# Patient Record
Sex: MALE | HISPANIC OR LATINO | Employment: FULL TIME | URBAN - METROPOLITAN AREA
[De-identification: names, ages, dates, MRNs, and addresses within clinical notes are randomized per-mention and may not be internally consistent; named-entity substitution may affect disease eponyms.]

---

## 2023-06-10 ENCOUNTER — HOSPITAL ENCOUNTER (EMERGENCY)
Facility: CLINIC | Age: 23
Discharge: HOME | End: 2023-06-10
Attending: STUDENT IN AN ORGANIZED HEALTH CARE EDUCATION/TRAINING PROGRAM | Admitting: STUDENT IN AN ORGANIZED HEALTH CARE EDUCATION/TRAINING PROGRAM

## 2023-06-10 VITALS
HEART RATE: 80 BPM | SYSTOLIC BLOOD PRESSURE: 140 MMHG | WEIGHT: 148 LBS | TEMPERATURE: 98.8 F | OXYGEN SATURATION: 98 % | RESPIRATION RATE: 18 BRPM | DIASTOLIC BLOOD PRESSURE: 76 MMHG

## 2023-06-10 DIAGNOSIS — M54.50 ACUTE BILATERAL LOW BACK PAIN WITHOUT SCIATICA: ICD-10-CM

## 2023-06-10 PROCEDURE — 99283 EMERGENCY DEPT VISIT LOW MDM: CPT

## 2023-06-10 RX ORDER — CYCLOBENZAPRINE HCL 5 MG
5 TABLET ORAL 3 TIMES DAILY PRN
Qty: 15 TABLET | Refills: 0 | Status: SHIPPED | OUTPATIENT
Start: 2023-06-10 | End: 2023-06-15

## 2023-06-10 ASSESSMENT — ENCOUNTER SYMPTOMS
FEVER: 0
DYSURIA: 0
HEMATURIA: 0
NUMBNESS: 0
CONSTIPATION: 0
DIARRHEA: 0
WEAKNESS: 0
FREQUENCY: 0
DIFFICULTY URINATING: 0
BACK PAIN: 1

## 2023-06-10 ASSESSMENT — ACTIVITIES OF DAILY LIVING (ADL): ADLS_ACUITY_SCORE: 33

## 2023-06-10 NOTE — LETTER
Yudy 10, 2023      To Whom It May Concern:      Hosea Isaacs was seen in our Emergency Department today, 06/10/23.  I expect his condition to improve over the next 10 days.  He may return to work when improved.    Sincerely,        LYNNE Saldana MD

## 2023-06-10 NOTE — ED TRIAGE NOTES
Pt presents to the ED with c/o lower back pain that started yesterday, has been increasing since. Denies any known injury/truama. Has taken ibuprofen with some relief. No hx of back pain/injuries     Triage Assessment     Row Name 06/10/23 0933       Triage Assessment (Adult)    Airway WDL WDL       Respiratory WDL    Respiratory WDL WDL       Skin Circulation/Temperature WDL    Skin Circulation/Temperature WDL WDL       Cardiac WDL    Cardiac WDL WDL       Peripheral/Neurovascular WDL    Peripheral Neurovascular WDL WDL       Cognitive/Neuro/Behavioral WDL    Cognitive/Neuro/Behavioral WDL WDL

## 2023-06-10 NOTE — ED NOTES
Pt agree's with discharge instructions, no other questions at this time. See providers notes for further assessment.

## 2023-06-10 NOTE — ED PROVIDER NOTES
EMERGENCY DEPARTMENT ENCOUNTER      NAME: Hosea Isaacs  AGE: 23 year old male  YOB: 2000  MRN: [id number]  EVALUATION DATE & TIME: No admission date for patient encounter.    PCP: No Ref-Primary, Physician    ED PROVIDER: Carlos A Saldana M.D.      Chief Complaint   Patient presents with     Back Pain         FINAL IMPRESSION:  1. Acute bilateral low back pain without sciatica          ED COURSE & MEDICAL DECISION MAKING:    Pertinent Labs & Imaging studies reviewed. (See chart for details)  23 year old male presents to the Emergency Department for evaluation of back pain.  Patient with clearly musculoskeletal back pain.  We did speak to there was some pain a little bit more lateral than I would expect.  But I suspect this is not true CVA tenderness and is clearly lower lumbar spine pain.  We will discharge patient with conservative treatment.  No red flags so while MRI was certainly considered I do not think this is necessary.    4:33 PM I met with the patient for the initial interview and physical examination. Discussed plan for treatment and workup in the ED. We discussed the plan for discharge and the patient is agreeable. Reviewed supportive cares, symptomatic treatment, outpatient follow up, and reasons to return to the Emergency Department. All questions and concerns were addressed. Patient to be discharged by ED RN.      At the conclusion of the encounter I discussed the results of all of the tests and the disposition. The questions were answered. The patient or family acknowledged understanding and was agreeable with the care plan.              Medical Decision Making    History:    Supplemental history from: Documented in chart, if applicable    External Record(s) reviewed: Documented in chart, if applicable.    Work Up:    Chart documentation includes differential considered and any EKGs or imaging independently interpreted by provider, where specified.    In additional to work up  documented, I considered the following work up: Documented in chart, if applicable.    External consultation:    Discussion of management with another provider: Documented in chart, if applicable    Complicating factors:    Care impacted by chronic illness: N/A    Care affected by social determinants of health: Access to Medical Care    Disposition considerations: Discharge. I prescribed additional prescription strength medication(s) as charted. See documentation for any additional details.      MEDICATIONS GIVEN IN THE EMERGENCY:  Medications - No data to display    NEW PRESCRIPTIONS STARTED AT TODAY'S ER VISIT  Discharge Medication List as of 6/10/2023  5:13 PM      START taking these medications    Details   cyclobenzaprine (FLEXERIL) 5 MG tablet Take 1 tablet (5 mg) by mouth 3 times daily as needed for muscle spasms, Disp-15 tablet, R-0, Local Print                =================================================================    HPI    Patient information was obtained from: Patient         Hosea Isaacs is a 23 year old male with no contributory history who presents for evaluation of back pain.    Patient reports sharp lower back pain that developed last night and has worsened since waking up this morning. He does note that he had gone dancing last night and states that the pain is exacerbated by positional changes such as twisting or bending down. Patient denies numbness, weakness, bowel or bladder symptoms, fever, or additional symptoms or complaints at this time.       REVIEW OF SYSTEMS   Review of Systems   Constitutional: Negative for fever.   Gastrointestinal: Negative for constipation and diarrhea.   Genitourinary: Negative for difficulty urinating, dysuria, frequency, hematuria and urgency.   Musculoskeletal: Positive for back pain (lower).   Neurological: Negative for weakness and numbness.   All other systems reviewed and are negative.       PAST MEDICAL HISTORY:  History reviewed. No pertinent  past medical history.    PAST SURGICAL HISTORY:  History reviewed. No pertinent surgical history.        CURRENT MEDICATIONS:    cyclobenzaprine (FLEXERIL) 5 MG tablet        ALLERGIES:  No Known Allergies    FAMILY HISTORY:  History reviewed. No pertinent family history.    SOCIAL HISTORY:   Social History     Socioeconomic History     Marital status: Single       VITALS:  BP (!) 140/76   Pulse 80   Temp 98.8  F (37.1  C) (Temporal)   Resp 18   Wt 67.1 kg (148 lb)   SpO2 98%       PHYSICAL EXAM    VITAL SIGNS: BP (!) 140/76   Pulse 80   Temp 98.8  F (37.1  C) (Temporal)   Resp 18   Wt 67.1 kg (148 lb)   SpO2 98%   Constitutional:  Well developed, well nourished   EYES: Conjunctivae clear, no discharge  HENT: Atraumatic, normocephalic, bilateral external ears normal.  Oropharynx moist. Nose normal.   Neck: Normal ROM , Supple   Respiratory:  No respiratory distress, normal nonlabored respirations.   Cardiovascular:  Distal perfusion appears intact  Musculoskeletal:  No edema appreciated, No cyanosis, No clubbing. Good range of motion in all major joints. Pain to palpation of the right lower lumbar paraspinal muscles. No CVA tenderness. Normal strength in lower extremities   Integument:  Warm, Dry, No erythema, No rash.   Neurologic:  Alert and oriented. No focal deficits noted.  Ambulatory  Psychiatric:  Affect normal      LAB:  All pertinent labs reviewed and interpreted.  Labs Ordered and Resulted from Time of ED Arrival to Time of ED Departure - No data to display    RADIOLOGY:  Reviewed all pertinent imaging. Please see official radiology report.  No orders to display       Laureano MONTERROSO, am serving as a scribe to document services personally performed by Dr. Carlos A Saldana based on my observation and the provider's statements to me. Carlos A MNOTERROSO MD attest that Laureano Montes is acting in a scribe capacity, has observed my performance of the services and has documented them in accordance with my  direction.    Carlos A Saldana M.D.  Emergency Medicine  [facility] EMERGENCY ROOM  [address] [address] [phone]  Dept: [phone]     Carlos A Saldana MD  06/11/23 0029

## 2024-07-23 ENCOUNTER — OFFICE VISIT (OUTPATIENT)
Dept: FAMILY MEDICINE | Facility: CLINIC | Age: 24
End: 2024-07-23
Payer: COMMERCIAL

## 2024-07-23 VITALS
HEART RATE: 83 BPM | WEIGHT: 152.2 LBS | HEIGHT: 65 IN | SYSTOLIC BLOOD PRESSURE: 114 MMHG | DIASTOLIC BLOOD PRESSURE: 74 MMHG | BODY MASS INDEX: 25.36 KG/M2 | OXYGEN SATURATION: 100 % | RESPIRATION RATE: 14 BRPM | TEMPERATURE: 98.5 F

## 2024-07-23 DIAGNOSIS — F43.23 ADJUSTMENT DISORDER WITH MIXED ANXIETY AND DEPRESSED MOOD: Primary | ICD-10-CM

## 2024-07-23 DIAGNOSIS — R07.89 ATYPICAL CHEST PAIN: ICD-10-CM

## 2024-07-23 LAB
ALBUMIN SERPL BCG-MCNC: 5.2 G/DL (ref 3.5–5.2)
ALP SERPL-CCNC: 89 U/L (ref 40–150)
ALT SERPL W P-5'-P-CCNC: 26 U/L (ref 0–70)
ANION GAP SERPL CALCULATED.3IONS-SCNC: 10 MMOL/L (ref 7–15)
AST SERPL W P-5'-P-CCNC: 30 U/L (ref 0–45)
ATRIAL RATE - MUSE: 66 BPM
BILIRUB SERPL-MCNC: 1 MG/DL
BUN SERPL-MCNC: 17 MG/DL (ref 6–20)
CALCIUM SERPL-MCNC: 9.8 MG/DL (ref 8.8–10.4)
CHLORIDE SERPL-SCNC: 102 MMOL/L (ref 98–107)
CREAT SERPL-MCNC: 0.8 MG/DL (ref 0.67–1.17)
DIASTOLIC BLOOD PRESSURE - MUSE: NORMAL MMHG
EGFRCR SERPLBLD CKD-EPI 2021: >90 ML/MIN/1.73M2
ERYTHROCYTE [DISTWIDTH] IN BLOOD BY AUTOMATED COUNT: 12.7 % (ref 10–15)
GLUCOSE SERPL-MCNC: 90 MG/DL (ref 70–99)
HCO3 SERPL-SCNC: 28 MMOL/L (ref 22–29)
HCT VFR BLD AUTO: 48 % (ref 40–53)
HGB BLD-MCNC: 15.8 G/DL (ref 13.3–17.7)
INTERPRETATION ECG - MUSE: NORMAL
MCH RBC QN AUTO: 30 PG (ref 26.5–33)
MCHC RBC AUTO-ENTMCNC: 32.9 G/DL (ref 31.5–36.5)
MCV RBC AUTO: 91 FL (ref 78–100)
P AXIS - MUSE: 38 DEGREES
PLATELET # BLD AUTO: 273 10E3/UL (ref 150–450)
POTASSIUM SERPL-SCNC: 5.2 MMOL/L (ref 3.4–5.3)
PR INTERVAL - MUSE: 138 MS
PROT SERPL-MCNC: 8.2 G/DL (ref 6.4–8.3)
QRS DURATION - MUSE: 94 MS
QT - MUSE: 376 MS
QTC - MUSE: 394 MS
R AXIS - MUSE: 61 DEGREES
RBC # BLD AUTO: 5.27 10E6/UL (ref 4.4–5.9)
SODIUM SERPL-SCNC: 140 MMOL/L (ref 135–145)
SYSTOLIC BLOOD PRESSURE - MUSE: NORMAL MMHG
T AXIS - MUSE: 54 DEGREES
TSH SERPL DL<=0.005 MIU/L-ACNC: 0.9 UIU/ML (ref 0.3–4.2)
VENTRICULAR RATE- MUSE: 66 BPM
WBC # BLD AUTO: 4.7 10E3/UL (ref 4–11)

## 2024-07-23 PROCEDURE — 84443 ASSAY THYROID STIM HORMONE: CPT

## 2024-07-23 PROCEDURE — 93005 ELECTROCARDIOGRAM TRACING: CPT

## 2024-07-23 PROCEDURE — 36415 COLL VENOUS BLD VENIPUNCTURE: CPT

## 2024-07-23 PROCEDURE — 85027 COMPLETE CBC AUTOMATED: CPT

## 2024-07-23 PROCEDURE — 99203 OFFICE O/P NEW LOW 30 MIN: CPT

## 2024-07-23 PROCEDURE — 80053 COMPREHEN METABOLIC PANEL: CPT

## 2024-07-23 PROCEDURE — 93010 ELECTROCARDIOGRAM REPORT: CPT | Performed by: GENERAL ACUTE CARE HOSPITAL

## 2024-07-23 NOTE — PROGRESS NOTES
Assessment & Plan     Adjustment disorder with mixed anxiety and depressed mood  Recent situational stressors with break-up.  Reports still has good support through friends.  [place]  Will have trouble regulating his emotions at times.  Referral placed for counseling/therapy and we discussed following up if he would like to discuss medications.  - Adult Mental Health  Referral    Atypical chest pain  Intermittent nonexertional chest pain without associated symptoms or specific triggers ongoing for many years.  EKG today showing normal sinus rhythm/sinus arrhythmia.  Recommend evaluation with labs and if unremarkable we discussed him monitoring for any clear trigger or associated symptoms.  We discussed possibility of anxiety, musculoskeletal, gastrointestinal etiology.  - REVIEW OF HEALTH MAINTENANCE PROTOCOL ORDERS  - Comprehensive metabolic panel (BMP + Alb, Alk Phos, ALT, AST, Total. Bili, TP)  - TSH with free T4 reflex  - CBC with platelets  - EKG 12-lead, tracing only      Follow-up for routine annual physical.        Chente Jc is a 24 year old, presenting for the following health issues:  [facility] Visit (Mental Health Therapy referral, sharp chest pain on and off every few months. Pt thinks he has autism.) and Recheck Medication      7/23/2024    12:21 PM   Additional Questions   Roomed by lawanda fermin   Accompanied by alone     History of Present Illness       Reason for visit:  Health Check up    He eats 0-1 servings of fruits and vegetables daily.He consumes 2 sweetened beverage(s) daily.He exercises with enough effort to increase his heart rate 10 to 19 minutes per day.  He exercises with enough effort to increase his heart rate 3 or less days per week.   He is taking medications regularly.     Would like to discuss his mental health.  Difficulty regulating emotions.  Irritable, feels like when he is overwhelmed with lash out at family/friends.  Recent breakup. Originally  "from [place], moved here 3 years ago to be with ex-partner. Still living together. Denies thoughts of self harm or suicidal ideation.   Occasional intrusive thoughts and ruminating.   Sleep and appetite have been okay.     - travels frequently.    Intermittent chest pain - for years. Sharp, does not radiate. Denies palpitations, dyspnea, nausea. Drinks caffeine and energy drinks on occasion.  Starts spontaneously and resolves on its own.   Has never occurred when exercing. Notice more when sitting at rest.  Unsure if triggered by foods or related to food intake.     Had Tdap in the last few years due to a work related injury.       Review of Systems  Constitutional, HEENT, cardiovascular, pulmonary, gi and gu systems are negative, except as otherwise noted.      Objective    /74 (BP Location: Left arm, Patient Position: Sitting, Cuff Size: Adult Regular)   Pulse 83   Temp 98.5  F (36.9  C) (Tympanic)   Resp 14   Ht 1.64 m (5' 4.57\")   Wt 69 kg (152 lb 3.2 oz)   SpO2 100%   BMI 25.67 kg/m    Body mass index is 25.67 kg/m .    Physical Exam   GENERAL: alert and no distress  NECK: no adenopathy, no asymmetry, masses, or scars  RESP: lungs clear to auscultation - no rales, rhonchi or wheezes  CV: regular rate and rhythm, normal S1 S2, no S3 or S4, no murmur, click or rub, no peripheral edema  ABDOMEN: soft, nontender, no hepatosplenomegaly, no masses and bowel sounds normal  PSYCH: mentation appears normal and affect flat        Signed Electronically by: MAGDY Manzano CNP    "

## 2024-07-28 ENCOUNTER — HEALTH MAINTENANCE LETTER (OUTPATIENT)
Age: 24
End: 2024-07-28

## 2024-08-12 ENCOUNTER — HOSPITAL ENCOUNTER (INPATIENT)
Facility: CLINIC | Age: 24
LOS: 4 days | Discharge: HOME | End: 2024-08-16
Attending: PSYCHIATRY & NEUROLOGY | Admitting: PSYCHIATRY & NEUROLOGY
Payer: COMMERCIAL

## 2024-08-12 ENCOUNTER — HOSPITAL ENCOUNTER (EMERGENCY)
Facility: CLINIC | Age: 24
Discharge: NURSING, REHAB OR LONG-TERM CARE | End: 2024-08-12
Attending: EMERGENCY MEDICINE | Admitting: EMERGENCY MEDICINE
Payer: COMMERCIAL

## 2024-08-12 ENCOUNTER — TELEPHONE (OUTPATIENT)
Dept: BEHAVIORAL HEALTH | Facility: CLINIC | Age: 24
End: 2024-08-12
Payer: COMMERCIAL

## 2024-08-12 VITALS
SYSTOLIC BLOOD PRESSURE: 154 MMHG | HEIGHT: 64 IN | OXYGEN SATURATION: 98 % | RESPIRATION RATE: 18 BRPM | BODY MASS INDEX: 25.61 KG/M2 | TEMPERATURE: 99.3 F | WEIGHT: 150 LBS | DIASTOLIC BLOOD PRESSURE: 112 MMHG | HEART RATE: 110 BPM

## 2024-08-12 DIAGNOSIS — F51.05 INSOMNIA DUE TO OTHER MENTAL DISORDER: ICD-10-CM

## 2024-08-12 DIAGNOSIS — F41.9 ANXIETY: ICD-10-CM

## 2024-08-12 DIAGNOSIS — F99 INSOMNIA DUE TO OTHER MENTAL DISORDER: ICD-10-CM

## 2024-08-12 DIAGNOSIS — F33.41 RECURRENT MAJOR DEPRESSIVE DISORDER, IN PARTIAL REMISSION (H): Primary | ICD-10-CM

## 2024-08-12 DIAGNOSIS — R45.851 SUICIDAL IDEATION: ICD-10-CM

## 2024-08-12 DIAGNOSIS — F43.23 ADJUSTMENT DISORDER WITH MIXED ANXIETY AND DEPRESSED MOOD: ICD-10-CM

## 2024-08-12 PROBLEM — F32.9 MAJOR DEPRESSION: Status: ACTIVE | Noted: 2024-08-12

## 2024-08-12 LAB
ALBUMIN SERPL BCG-MCNC: 5.3 G/DL (ref 3.5–5.2)
ALP SERPL-CCNC: 95 U/L (ref 40–150)
ALT SERPL W P-5'-P-CCNC: 47 U/L (ref 0–70)
AMPHETAMINES UR QL SCN: NORMAL
ANION GAP SERPL CALCULATED.3IONS-SCNC: 11 MMOL/L (ref 7–15)
AST SERPL W P-5'-P-CCNC: 43 U/L (ref 0–45)
BARBITURATES UR QL SCN: NORMAL
BASOPHILS # BLD AUTO: 0 10E3/UL (ref 0–0.2)
BASOPHILS NFR BLD AUTO: 1 %
BENZODIAZ UR QL SCN: NORMAL
BILIRUB SERPL-MCNC: 0.7 MG/DL
BUN SERPL-MCNC: 11.4 MG/DL (ref 6–20)
BZE UR QL SCN: NORMAL
CALCIUM SERPL-MCNC: 9.7 MG/DL (ref 8.8–10.4)
CANNABINOIDS UR QL SCN: NORMAL
CHLORIDE SERPL-SCNC: 103 MMOL/L (ref 98–107)
CREAT SERPL-MCNC: 0.75 MG/DL (ref 0.51–1.17)
EGFRCR SERPLBLD CKD-EPI 2021: >90 ML/MIN/1.73M2
EOSINOPHIL # BLD AUTO: 0 10E3/UL (ref 0–0.7)
EOSINOPHIL NFR BLD AUTO: 0 %
ERYTHROCYTE [DISTWIDTH] IN BLOOD BY AUTOMATED COUNT: 12.9 % (ref 10–15)
FENTANYL UR QL: NORMAL
GLUCOSE SERPL-MCNC: 98 MG/DL (ref 70–99)
HCO3 SERPL-SCNC: 28 MMOL/L (ref 22–29)
HCT VFR BLD AUTO: 49.4 % (ref 35–53)
HGB BLD-MCNC: 16.8 G/DL (ref 11.7–17.7)
IMM GRANULOCYTES # BLD: 0 10E3/UL
IMM GRANULOCYTES NFR BLD: 0 %
LYMPHOCYTES # BLD AUTO: 1.3 10E3/UL (ref 0.8–5.3)
LYMPHOCYTES NFR BLD AUTO: 16 %
MCH RBC QN AUTO: 31.2 PG (ref 26.5–33)
MCHC RBC AUTO-ENTMCNC: 34 G/DL (ref 31.5–36.5)
MCV RBC AUTO: 92 FL (ref 78–100)
MONOCYTES # BLD AUTO: 0.7 10E3/UL (ref 0–1.3)
MONOCYTES NFR BLD AUTO: 9 %
NEUTROPHILS # BLD AUTO: 6.1 10E3/UL (ref 1.6–8.3)
NEUTROPHILS NFR BLD AUTO: 75 %
NRBC # BLD AUTO: 0 10E3/UL
NRBC BLD AUTO-RTO: 0 /100
OPIATES UR QL SCN: NORMAL
PCP QUAL URINE (ROCHE): NORMAL
PLATELET # BLD AUTO: 287 10E3/UL (ref 150–450)
POTASSIUM SERPL-SCNC: 4.1 MMOL/L (ref 3.4–5.3)
PROT SERPL-MCNC: 8.6 G/DL (ref 6.4–8.3)
RBC # BLD AUTO: 5.38 10E6/UL (ref 3.8–5.9)
SARS-COV-2 RNA RESP QL NAA+PROBE: NEGATIVE
SODIUM SERPL-SCNC: 142 MMOL/L (ref 135–145)
TSH SERPL DL<=0.005 MIU/L-ACNC: 0.95 UIU/ML (ref 0.3–4.2)
WBC # BLD AUTO: 8.1 10E3/UL (ref 4–11)

## 2024-08-12 PROCEDURE — 99285 EMERGENCY DEPT VISIT HI MDM: CPT | Mod: 25 | Performed by: EMERGENCY MEDICINE

## 2024-08-12 PROCEDURE — 82040 ASSAY OF SERUM ALBUMIN: CPT | Performed by: EMERGENCY MEDICINE

## 2024-08-12 PROCEDURE — 80307 DRUG TEST PRSMV CHEM ANLYZR: CPT | Performed by: EMERGENCY MEDICINE

## 2024-08-12 PROCEDURE — 250N000013 HC RX MED GY IP 250 OP 250 PS 637: Performed by: NURSE PRACTITIONER

## 2024-08-12 PROCEDURE — 36415 COLL VENOUS BLD VENIPUNCTURE: CPT | Performed by: EMERGENCY MEDICINE

## 2024-08-12 PROCEDURE — 87635 SARS-COV-2 COVID-19 AMP PRB: CPT | Performed by: EMERGENCY MEDICINE

## 2024-08-12 PROCEDURE — 99285 EMERGENCY DEPT VISIT HI MDM: CPT | Performed by: EMERGENCY MEDICINE

## 2024-08-12 PROCEDURE — 128N000002 HC R&B CD/MH ADOLESCENT

## 2024-08-12 PROCEDURE — H2032 ACTIVITY THERAPY, PER 15 MIN: HCPCS | Performed by: PSYCHOLOGIST

## 2024-08-12 PROCEDURE — 85048 AUTOMATED LEUKOCYTE COUNT: CPT | Performed by: EMERGENCY MEDICINE

## 2024-08-12 PROCEDURE — 84443 ASSAY THYROID STIM HORMONE: CPT | Performed by: EMERGENCY MEDICINE

## 2024-08-12 RX ORDER — IBUPROFEN 200 MG
200 TABLET ORAL EVERY 4 HOURS PRN
COMMUNITY

## 2024-08-12 RX ORDER — AMOXICILLIN 250 MG
1 CAPSULE ORAL 2 TIMES DAILY PRN
Status: DISCONTINUED | OUTPATIENT
Start: 2024-08-12 | End: 2024-08-16 | Stop reason: HOSPADM

## 2024-08-12 RX ORDER — IBUPROFEN 200 MG
200 TABLET ORAL EVERY 4 HOURS PRN
Status: DISCONTINUED | OUTPATIENT
Start: 2024-08-12 | End: 2024-08-16 | Stop reason: HOSPADM

## 2024-08-12 RX ORDER — HYDROXYZINE HYDROCHLORIDE 25 MG/1
25 TABLET, FILM COATED ORAL EVERY 4 HOURS PRN
Status: DISCONTINUED | OUTPATIENT
Start: 2024-08-12 | End: 2024-08-13

## 2024-08-12 RX ORDER — ACETAMINOPHEN 325 MG/1
650 TABLET ORAL EVERY 4 HOURS PRN
Status: DISCONTINUED | OUTPATIENT
Start: 2024-08-12 | End: 2024-08-16 | Stop reason: HOSPADM

## 2024-08-12 RX ORDER — MAGNESIUM HYDROXIDE/ALUMINUM HYDROXICE/SIMETHICONE 120; 1200; 1200 MG/30ML; MG/30ML; MG/30ML
30 SUSPENSION ORAL EVERY 4 HOURS PRN
Status: DISCONTINUED | OUTPATIENT
Start: 2024-08-12 | End: 2024-08-16 | Stop reason: HOSPADM

## 2024-08-12 RX ORDER — TRAZODONE HYDROCHLORIDE 50 MG/1
50 TABLET, FILM COATED ORAL
Status: DISCONTINUED | OUTPATIENT
Start: 2024-08-12 | End: 2024-08-14

## 2024-08-12 RX ADMIN — HYDROXYZINE HYDROCHLORIDE 25 MG: 25 TABLET ORAL at 21:46

## 2024-08-12 ASSESSMENT — ENCOUNTER SYMPTOMS
EYES NEGATIVE: 1
MUSCULOSKELETAL NEGATIVE: 1
ENDOCRINE NEGATIVE: 1
GASTROINTESTINAL NEGATIVE: 1
NEUROLOGICAL NEGATIVE: 1
RESPIRATORY NEGATIVE: 1
HEMATOLOGIC/LYMPHATIC NEGATIVE: 1
ALLERGIC/IMMUNOLOGIC NEGATIVE: 1
CARDIOVASCULAR NEGATIVE: 1
CONSTITUTIONAL NEGATIVE: 1

## 2024-08-12 ASSESSMENT — COLUMBIA-SUICIDE SEVERITY RATING SCALE - C-SSRS
4. HAVE YOU HAD THESE THOUGHTS AND HAD SOME INTENTION OF ACTING ON THEM?: NO
5. HAVE YOU STARTED TO WORK OUT OR WORKED OUT THE DETAILS OF HOW TO KILL YOURSELF? DO YOU INTEND TO CARRY OUT THIS PLAN?: YES
6. HAVE YOU EVER DONE ANYTHING, STARTED TO DO ANYTHING, OR PREPARED TO DO ANYTHING TO END YOUR LIFE?: YES
1. IN THE PAST MONTH, HAVE YOU WISHED YOU WERE DEAD OR WISHED YOU COULD GO TO SLEEP AND NOT WAKE UP?: YES
2. HAVE YOU ACTUALLY HAD ANY THOUGHTS OF KILLING YOURSELF IN THE PAST MONTH?: YES
3. HAVE YOU BEEN THINKING ABOUT HOW YOU MIGHT KILL YOURSELF?: YES

## 2024-08-12 ASSESSMENT — ACTIVITIES OF DAILY LIVING (ADL)
ADLS_ACUITY_SCORE: 45
ADLS_ACUITY_SCORE: 35
ADLS_ACUITY_SCORE: 35
ADLS_ACUITY_SCORE: 45
ADLS_ACUITY_SCORE: 35
ADLS_ACUITY_SCORE: 43
ADLS_ACUITY_SCORE: 45
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
## 2024-08-12 NOTE — ED PROVIDER NOTES
"  History     Chief Complaint   Patient presents with    Suicidal     Suicidal thoughts with plan for 15 hrs     HPI  Hosea Isaacs is a 24 year old male who presents with concern about feeling depressed with thoughts of self-harm.    Reviewed the medical record.  Office visit on 7/23/24-  diagnosis of adjustment disorder with mixed anxiety and depressed mood    On examination patient reports she feels depressed and that she has had thoughts of self-harm including jumping off a bridge or becoming a victim of a hate crime while intoxicated. Patient arrived by car alone from home in [place], [place] after reporting that she had called the crisis line.  She reports that she found some \"raunchy text messages\" on his ex-partner's phone last night while in bed.  She reports that she works as a  for [employer].  Originally from [facility] moved to [place] 3 years ago.  Due to infidelity with text messages seen on partner's phone yesterday she reports this made her feel depressed and ever since has had thoughts of ending her own life.  After conversation with the crisis line she drove to the emergency department for further care    Allergies:  No Known Allergies    Problem List:    Patient Active Problem List    Diagnosis Date Noted    Anxiety 08/12/2024     Priority: Medium    Major depression 08/12/2024     Priority: Medium        Past Medical History:    No past medical history on file.    Past Surgical History:    No past surgical history on file.    Family History:    No family history on file.    Social History:  Marital Status:  Single [1]  Social History     Tobacco Use    Smoking status: Never    Smokeless tobacco: Never   Vaping Use    Vaping status: Never Used        Medications:    ibuprofen (ADVIL/MOTRIN) 200 MG tablet          Review of Systems   Constitutional: Negative.    HENT: Negative.     Eyes: Negative.    Respiratory: Negative.     Cardiovascular: Negative.  " "  Gastrointestinal: Negative.    Endocrine: Negative.    Genitourinary: Negative.    Musculoskeletal: Negative.    Allergic/Immunologic: Negative.    Neurological: Negative.    Hematological: Negative.    Psychiatric/Behavioral:  Positive for suicidal ideas.    All other systems reviewed and are negative.      Physical Exam   BP: (!) 154/112  Pulse: 110  Temp: 99.3  F (37.4  C)  Resp: 18  Height: 162.6 cm (5' 4\")  Weight: 68 kg (150 lb)  SpO2: 98 %      Physical Exam  Constitutional:       General: She is not in acute distress.     Appearance: Normal appearance. She is not ill-appearing, toxic-appearing or diaphoretic.   HENT:      Head: Normocephalic and atraumatic.      Nose: Nose normal.   Eyes:      Extraocular Movements: Extraocular movements intact.      Pupils: Pupils are equal, round, and reactive to light.   Cardiovascular:      Rate and Rhythm: Normal rate and regular rhythm.      Pulses: Normal pulses.   Pulmonary:      Effort: Pulmonary effort is normal.      Breath sounds: Normal breath sounds.   Musculoskeletal:      Cervical back: Neck supple.   Skin:     Capillary Refill: Capillary refill takes less than 2 seconds.   Neurological:      General: No focal deficit present.      Mental Status: She is alert and oriented to person, place, and time.      GCS: GCS eye subscore is 4. GCS verbal subscore is 5. GCS motor subscore is 6.      Cranial Nerves: No cranial nerve deficit.      Sensory: No sensory deficit.      Motor: No weakness.      Coordination: Coordination normal.      Deep Tendon Reflexes: Reflexes normal.   Psychiatric:         Mood and Affect: Mood is depressed.         Thought Content: Thought content includes suicidal ideation. Thought content includes suicidal plan.         ED Course        Procedures              Critical Care time:  none             ED medications: none      ED Vitals:  Vitals:    08/12/24 1226   BP: (!) 154/112   Pulse: 110   Resp: 18   Temp: 99.3  F (37.4  C)   TempSrc: " "Oral   SpO2: 98%   Weight: 68 kg (150 lb)   Height: 1.626 m (5' 4\")     ED labs and imaging:  Results for orders placed or performed during the hospital encounter of 08/12/24   Urine Drug Screen Panel     Status: Normal   Result Value Ref Range    Amphetamines Urine Screen Negative Screen Negative    Barbituates Urine Screen Negative Screen Negative    Benzodiazepine Urine Screen Negative Screen Negative    Cannabinoids Urine Screen Negative Screen Negative    Cocaine Urine Screen Negative Screen Negative    Fentanyl Qual Urine Screen Negative Screen Negative    Opiates Urine Screen Negative Screen Negative    PCP Urine Screen Negative Screen Negative   Comprehensive metabolic panel     Status: Abnormal   Result Value Ref Range    Sodium 142 135 - 145 mmol/L    Potassium 4.1 3.4 - 5.3 mmol/L    Carbon Dioxide (CO2) 28 22 - 29 mmol/L    Anion Gap 11 7 - 15 mmol/L    Urea Nitrogen 11.4 6.0 - 20.0 mg/dL    Creatinine 0.75 0.51 - 1.17 mg/dL    GFR Estimate >90 >60 mL/min/1.73m2    Calcium 9.7 8.8 - 10.4 mg/dL    Chloride 103 98 - 107 mmol/L    Glucose 98 70 - 99 mg/dL    Alkaline Phosphatase 95 40 - 150 U/L    AST 43 0 - 45 U/L    ALT 47 0 - 70 U/L    Protein Total 8.6 (H) 6.4 - 8.3 g/dL    Albumin 5.3 (H) 3.5 - 5.2 g/dL    Bilirubin Total 0.7 <=1.2 mg/dL    Narrative    The generation of reference intervals for this test is currently based on binary male or female sex. If the electronic health record information indicates another gender identity or if Legal Sex is recorded as \"Unknown\", both male and female reference intervals are provided where applicable, and should be considered according to the individual's appropriate clinical context.   CBC with platelets and differential     Status: None   Result Value Ref Range    WBC Count 8.1 4.0 - 11.0 10e3/uL    RBC Count 5.38 3.80 - 5.90 10e6/uL    Hemoglobin 16.8 11.7 - 17.7 g/dL    Hematocrit 49.4 35.0 - 53.0 %    MCV 92 78 - 100 fL    MCH 31.2 26.5 - 33.0 pg    MCHC " "34.0 31.5 - 36.5 g/dL    RDW 12.9 10.0 - 15.0 %    Platelet Count 287 150 - 450 10e3/uL    % Neutrophils 75 %    % Lymphocytes 16 %    % Monocytes 9 %    % Eosinophils 0 %    % Basophils 1 %    % Immature Granulocytes 0 %    NRBCs per 100 WBC 0 <1 /100    Absolute Neutrophils 6.1 1.6 - 8.3 10e3/uL    Absolute Lymphocytes 1.3 0.8 - 5.3 10e3/uL    Absolute Monocytes 0.7 0.0 - 1.3 10e3/uL    Absolute Eosinophils 0.0 0.0 - 0.7 10e3/uL    Absolute Basophils 0.0 0.0 - 0.2 10e3/uL    Absolute Immature Granulocytes 0.0 <=0.4 10e3/uL    Absolute NRBCs 0.0 10e3/uL    Narrative    The generation of reference intervals for this test is currently based on binary male or female sex. If the electronic health record information indicates another gender identity or if Legal Sex is recorded as \"Unknown\", both male and female reference intervals are provided where applicable, and should be considered according to the individual's appropriate clinical context.   Urine Drug Screen     Status: Normal    Narrative    The following orders were created for panel order Urine Drug Screen.  Procedure                               Abnormality         Status                     ---------                               -----------         ------                     Urine Drug Screen Panel[[id number]]      Normal              Final result                 Please view results for these tests on the individual orders.   CBC with platelets differential     Status: None    Narrative    The following orders were created for panel order CBC with platelets differential.  Procedure                               Abnormality         Status                     ---------                               -----------         ------                     CBC with platelets and d...[[id number]]                      Final result                 Please view results for these tests on the individual orders.     Assessments & Plan (with Medical Decision Making)   Assessment " "Summary and clinical Impression: 24-year-old transgender female who presented with suicidal ideation with a plan.Patient has a prior diagnosis of adjustment disorder with mixed anxiety and depressed mood.  Patient reported symptoms were triggered by finding \" raunchy text messages on partner's phone\" yesterday and ever since has had thoughts of jumping off a bridge. Patient reports no active medications and that he has no medication allergies.  After conversation with the crisis line patient drove herself from [place] to [place] and presented to the emergency department for care.  Patient was pleasant in no acute distress but had depressed mood and affect.  DEC assessment was requested with the behavioral health provider on-call.  Patient was placed on a health officer hold with active suicidal ideations with a definite plan. After consultation with the behavioral health provider on-call plan is to admit for further inpatient care.    ED course and plan:  Reviewed the medical record.  Reviewed clinic visit on 7/23/24- normal thyroid function studies, electrolytes and hemogram.  With active suicidal ideations with a definite plan she was placed on health officer hold and DEC assessment was requested by behavioral health provider on-call.  Spoke with Kehr-Sparby at 2.03pm- [facility] consultant. We agreed thatit was prudent to admit the patient given high risk-prior attempt reported to the [facility] consultant, recent break-up/ trigger and patient endorsing a plan with suicidal ideation.  To facilitate inpatient care blood work was obtained as requested. Negative urine drug screen, normal complete metabolic profile, CBC, TSH and COVID. Patient accepted for transfer to [facility].  Accepting provider Dr. Foley.       Disclaimer: This note consists of symbols derived from keyboarding, dictation and/or voice recognition software. As a result, there may be errors in the script that have gone undetected. Please " consider this when interpreting information found in this chart.   I have reviewed the nursing notes.    I have reviewed the findings, diagnosis, plan and need for follow up with the patient.           Medical Decision Making  The patient's presentation was of high complexity (history of adjustment disorder with depressed mood ).    The patient's evaluation involved:  ordering and/or review of 2 test(s) in this encounter (diagnostic imaging and lab to facilitate inpatient care)    The patient's management necessitated high risk (consultation with the behavioral health provider on-call).        New Prescriptions    No medications on file       Final diagnoses:   Suicidal ideation - with a plan   Adjustment disorder with mixed anxiety and depressed mood       8/12/2024   [facility] EMERGENCY DEPT       Malcolm Powell MD  08/12/24 6945

## 2024-08-12 NOTE — TELEPHONE ENCOUNTER
R:    2:24p Paged Psychiatry MAGDY Spencer, awaiting response.     [2:34 PM] JohannaGemma    MRN [id number] accepted to 6A.    Intake to update work lists.     2:51p Called Unit 6A CRN Rafael during shift exchange to inform pt is in queue for the unit. CRN informed Unit will call ED for report when they can admit pt, likely after 4:00p.     2:53p Called [facility] ELBERT Naylor informing pt is in queue for SHERIDAN/Krista. 6A CRN informed Unit will call ED for report when they can admit pt, likely after 4:00p.     3:02p Indicia Completed.     3:03p Intake notes all work lists updated with pt's acceptance.

## 2024-08-12 NOTE — MEDICATION SCRIBE - ADMISSION MEDICATION HISTORY
Medication Scribe Admission Medication History    Admission medication history is complete. The information provided in this note is only as accurate as the sources available at the time of the update.    Information Source(s): Patient via in-person    Pertinent Information: Patient was interviewed at bedside and stated that they are not on any current medications and do not have any allergies.    Changes made to PTA medication list:  Added: ibuprofen  Deleted: None  Changed: None    Allergies reviewed with patient and updates made in EHR: yes    Medication History Completed By: MONIQUE DOSS 8/12/2024 2:01 PM    PTA Med List   Medication Sig Last Dose    ibuprofen (ADVIL/MOTRIN) 200 MG tablet Take 200 mg by mouth every 4 hours as needed for pain Past Week at unknown

## 2024-08-12 NOTE — PLAN OF CARE
Hosea Isaacs  August 12, 2024  Plan of Care Hand-off Note     Patient Care Path: inpatient mental health    Plan for Care:   Pt is a transgender female who uses she/her pronouns.  Pt presents to the ED with suicidal ideation.  She reports recent stressors of a breakup and continued conflict within the relationship.  Pt had a plan to get drunk and jump off a bridge last night.  Her friends found out about her plans and tried to intervene and pt didn't follow through.  Pt reports still feeling low this morning, called crisis and was recommended to go to ED.  Pt denies any formal diagnoses but reports experiencing anxiety and depression throughout her life.  She reports a previous suicide attempt as a teenager where she was going to jump [place] the [place] but it was [place].  Pt reports a history of SIB by cutting, with most recent debi being two years ago.  Pt denies previous psychiatric hospitalizations and denies any past or present mental health medications.  Pt denies HI or hallucinations.  It is the recommendation of this clinician that pt admit to IP MH for safety and stabilization. Pt displays the following risk factors that support IP admission: She continues to report hopelessness and feels anxious about the thought of leaving the hospital. Additionally, pt does not have a lot of supports in [place] and is still currently living with her ex-girlfriend which is a source of stress.  Pt is also transgender which significantly increases risk rate for suicide. Pt is unable to engage in safety planning to mitigate risk level in a non-secure setting. Lower levels of care have not been effective in mitigating risk. Due to this IP is the least restrictive option of care for pt. Pt should remain in IP until deemed safe to return to the community and engage in OP MH supports.    Identified Goals and Safety Issues:  Stabilization of symptoms to the point where patient is able to engage in safety planning and  engage in outpatient services.     Overview:               Legal Status: Legal Status at Admission: Voluntary/Patient has signed consent for treatment    Psychiatry Consult:       Updated Dr. Powell  regarding plan of care.           Jocelyn Kehr Sparby, DORAC, LADC
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
## 2024-08-12 NOTE — ED TRIAGE NOTES
Pt reports suicidal thoughts with plan for the past 15 hrs. Pt states she/her attempted suicide in 2018. No medications, and no therapist. Pt drinks alcohol and occasionally does THC. Pt reports drinking a lot yesterday. Pt said her plan would be to jump off a bridge.      Triage Assessment (Adult)       Row Name 08/12/24 1227          Triage Assessment    Airway WDL WDL        Respiratory WDL    Respiratory WDL WDL        Cognitive/Neuro/Behavioral WDL    Cognitive/Neuro/Behavioral WDL WDL        [place] Coma Scale    Best Eye Response 4-->(E4) spontaneous     Best Motor Response 6-->(M6) obeys commands     Best Verbal Response 5-->(V5) oriented     [place] Coma Scale Score 15

## 2024-08-13 LAB
CHOLEST SERPL-MCNC: 127 MG/DL
HBA1C MFR BLD: 5.5 %
HDLC SERPL-MCNC: 42 MG/DL
LDLC SERPL CALC-MCNC: 68 MG/DL
NONHDLC SERPL-MCNC: 85 MG/DL
TRIGL SERPL-MCNC: 85 MG/DL

## 2024-08-13 PROCEDURE — 90853 GROUP PSYCHOTHERAPY: CPT

## 2024-08-13 PROCEDURE — 36415 COLL VENOUS BLD VENIPUNCTURE: CPT | Performed by: NURSE PRACTITIONER

## 2024-08-13 PROCEDURE — 82465 ASSAY BLD/SERUM CHOLESTEROL: CPT | Performed by: NURSE PRACTITIONER

## 2024-08-13 PROCEDURE — 250N000013 HC RX MED GY IP 250 OP 250 PS 637: Performed by: REGISTERED NURSE

## 2024-08-13 PROCEDURE — 97150 GROUP THERAPEUTIC PROCEDURES: CPT | Mod: GO

## 2024-08-13 PROCEDURE — 99223 1ST HOSP IP/OBS HIGH 75: CPT | Mod: AI | Performed by: REGISTERED NURSE

## 2024-08-13 PROCEDURE — 128N000002 HC R&B CD/MH ADOLESCENT

## 2024-08-13 PROCEDURE — 83036 HEMOGLOBIN GLYCOSYLATED A1C: CPT | Performed by: NURSE PRACTITIONER

## 2024-08-13 RX ORDER — HYDROXYZINE HYDROCHLORIDE 25 MG/1
25-50 TABLET, FILM COATED ORAL EVERY 4 HOURS PRN
Status: DISCONTINUED | OUTPATIENT
Start: 2024-08-13 | End: 2024-08-16 | Stop reason: HOSPADM

## 2024-08-13 RX ORDER — FLUOXETINE 10 MG/1
10 CAPSULE ORAL DAILY
Status: DISCONTINUED | OUTPATIENT
Start: 2024-08-13 | End: 2024-08-16 | Stop reason: HOSPADM

## 2024-08-13 RX ADMIN — FLUOXETINE HYDROCHLORIDE 10 MG: 10 CAPSULE ORAL at 13:21

## 2024-08-13 RX ADMIN — HYDROXYZINE HYDROCHLORIDE 25 MG: 25 TABLET, FILM COATED ORAL at 21:50

## 2024-08-13 ASSESSMENT — ACTIVITIES OF DAILY LIVING (ADL)
ADLS_ACUITY_SCORE: 28
ADLS_ACUITY_SCORE: 28
HYGIENE/GROOMING: INDEPENDENT
ADLS_ACUITY_SCORE: 28
HYGIENE/GROOMING: INDEPENDENT
ADLS_ACUITY_SCORE: 28

## 2024-08-13 NOTE — PLAN OF CARE
"  Rehab Group    Start time: 1020  End time: 1105  Patient time total: 10 minutes    attended partial group    #6 attended   Group Type: occupational therapy   Group Topic Covered: balanced lifestyle, cognitive activities, coping skills, healthy leisure time, problem solving, and social skills       Group Session Detail:  OT CLINIC     Patient Response/Contribution:  organized and attentive       Patient Detail:    Occupational therapy clinic to facilitate coping skill exploration, creative expression within personally meaningful activities, and clinical observation of social, cognitive, and kinesthetic performance skills. Pt response: Pt arrived to group in the last 15 minutes. Pt was oriented to group topic and was offered options to meet pt needs. Writer encouraged pt to fill out self-assessment form which she was receptive to and put forth good focus and effort to complete. See answers below.         [id number] OT Group (2 or more in attendance)      Patient Active Problem List   Diagnosis    Anxiety    Major depression    Suicidal ideation     Patient Self-Assessment: Pt was given and completed a written self-assessment form. OT staff reviewed with pt and explained the value of having them involved in their treatment plan, and provided options to meet current needs/self-identified goals.     What do you do during the day/evening? \", drag queen, watch shows, dance, feed cats, travel\"    What stressors do you face that trigger symptoms/substance use? \"Relationships, isolation, alcohol use, abandonment, rejection\"    Who are supportive people you enjoy spending time with? \"My trans friends, but I don't know them very well to ask for the support I need\"    What are your positive qualities? \"I'm kind, funny, and have a great credit score\"    What helps you to calm down or relax? \"Feeling safe and loved :( \"    Coping Skills you'd like to explore in OT:     Guided Relaxation / Meditation    Physical " Wellness / Exercise / Yoga / Al Chi    Cooking / Baking (may not be an offered group)   x Journaling / Coloring / Drawing    Arts & Crafts    Group Games    Mental Health Management / Discussion    Others:      What are your goals for when you leave the hospital?     Engage in OT group activities that support recovery     Work on self-cares to improve wellness   x Practice using coping strategies to manage stress and reduce symptoms    Participate in healthy, meaningful leisure activities   x Share thoughts and/or feelings on mental health or substance use    Improve focus and concentration during tasks    Communicate needs effectively    Increase confidence and self-esteem    Other:      PLAN: Will provide structure, support, and encouragement. Offer education on coping strategies and life management skills. Assist pt to increase self awareness regarding mental health issues. Will assess further in the areas of organization, problem solving, and concentration.

## 2024-08-13 NOTE — PROGRESS NOTES
08/13/24 1247   Individualization/Patient Specific Goals   Patient Vulnerabilities family/relationship conflict;substance abuse/addiction;poor impulse control;limited support system;traumatic event   Interprofessional Rounds   Summary There was discussion about pt's reason for admission.   Participants advanced practice nurse;nursing;OT;CTC;psychotherapy   Behavioral Team Discussion   Participants Nithin CURRAN; Maria Mayer RN; Sergo Jennings OT; Juani ANGEL; Dona Carrion Therapist   Progress Minimal. Recent Admit   Anticipated length of stay 3 to 5 days   Continued Stay Criteria/Rationale SI w/ a plan and needs stablization on medications   Medical/Physical See H&P   Plan Stablize on medications and discharge with increased outpatient supports   Rationale for change in precautions or plan Intial plan   Safety Plan Safe secure milieu   Anticipated Discharge Disposition home with family;home or self-care     PRECAUTIONS AND SAFETY    Behavioral Orders   Procedures    Code 1 - Restrict to Unit    Routine Programming     As clinically indicated    Status 15     Every 15 minutes.    Suicide precautions: Suicide Risk: MODERATE; Clinical rationale to override score: Exhibiting Suicidal/self-harm behaviors or thoughts     Patients on Suicide Precautions should have a Combination Diet ordered that includes a Diet selection(s) AND a Behavioral Tray selection for Safe Tray - with utensils, or Safe Tray - NO utensils       Order Specific Question:   Suicide Risk     Answer:   MODERATE     Order Specific Question:   Clinical rationale to override score:     Answer:   Exhibiting Suicidal/self-harm behaviors or thoughts

## 2024-08-13 NOTE — PLAN OF CARE
Rehab Group    Start time: 1415  End time: 1515  Patient time total: 60 minutes    attended full group    #5 attended   Group Type: occupational therapy   Group Topic Covered: balanced lifestyle, cognitive activities, healthy leisure time, and social skills       Group Session Detail:  Cognitive Leisure Task     Patient Response/Contribution:  positive affect, cooperative with task, organized, supportive of peers, socially appropriate, expressed understanding of topic, and actively engaged       Patient Detail:    Leisure exploration and participation group with a focus on visuospatial problem solving and social engagement via a group game. Pt Response: Pt demonstrated understanding of the novel 3-step task after an initial explanation. Pt remained focused and engaged throughout full duration of group. Pt was an encouraging and inclusive group member. Affect brightened over the course of group.         [id number] OT Group (2 or more in attendance)      Patient Active Problem List   Diagnosis    Anxiety    Major depression    Suicidal ideation

## 2024-08-13 NOTE — PLAN OF CARE
Problem: Anxiety Signs/Symptoms  Goal: Optimized Energy Level (Anxiety Signs/Symptoms)  8/12/2024 2244 by Yashira Serna RN  Outcome: Not Progressing  8/12/2024 2240 by Yashira Serna RN  Outcome: Not Progressing  Problem: Depressive Symptoms  Goal: Depressive Symptoms  Description: Signs and symptoms of listed problems will be absent or manageable.  8/12/2024 2244 by Yashira Serna RN  Outcome: Not Progressing  8/12/2024 2240 by Yashira Serna RN  Outcome: Not Progressing   Goal Outcome Evaluation:      Plan of Care Reviewed With: patient        Legal status- voluntary  Pt admitted to the unit at around 2000 from [facility], pt was admitted due to having SI with a plan to jump off the bridge. Pt main stressor is breaking up with girlfriend a few weeks ago but still living with her.  Pt is teary during admission, says that she feels very depressed and has bad anxiety of 10/10. Pt denies SI during admission, denies SIB, hallucination and pain. Pt contracts for safety. Calm and co-operative with admission process,   Pt actively consumes alcohol, last drink was yesterday before being admitted at the ED.   Pt oriented to unit, encouraged to attend groups which she did, spent time watching TV.  Pt has no scheduled medications,offered hydroxyzine PRN.   Pt reports having poor appetite for last few days, drunk one cup of orange juice, refused other offered snacks.
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
## 2024-08-13 NOTE — PROGRESS NOTES
Rehab Group    Start time: 2030  End time: 2125  Patient time total: 35 minutes    attended partial group    #5 attended   Group Type: recreation   Group Topic Covered: healthy leisure time       Group Session Detail:  TR leisure group     Patient Response/Contribution:  cooperative with task, attentive, and actively engaged       Patient Detail:    Pt participated in Therapeutic Recreation group with focus on leisure participation, communication skills, and critical thinking. Pt entered group late and left for some briefly in the middle of group, but was engaged and focused in the group recreational activity via a group game. Pt presented as quiet and guarded, but interacted as part of the group activity.       Activity Therapy Per 15 min ()      Patient Active Problem List   Diagnosis    Anxiety    Major depression    Suicidal ideation

## 2024-08-13 NOTE — PHARMACY-ADMISSION MEDICATION HISTORY
Admission medication history completed at [facility]. Please see Medication Scribe Admission Medication History note from 08/12/2024.     Estela Staples, [facility]  [phone] and/or available on [employer]

## 2024-08-13 NOTE — PROVIDER NOTIFICATION
08/12/24 6418   Patient Belongings   Did you bring any home meds/supplements to the hospital?  No   Patient Belongings locker   Patient Belongings Put in Hospital Secure Location (Security or Locker, etc.) wallet;shoes;clothing   Belongings Search Yes   Clothing Search Yes   Second Staff Yashira and Brice         In locker:  Pair of pink shoes  Green dress  Pink purse  2 lipstick  Cell phone  Lanyard with keys---given to Maia ivonne 8/13   wallet with cards    SENT TO SECURITY  [employer] red card  3 visa cards  $40 cash   Mastercard  2 gift cards        A               Admission:  I am responsible for any personal items that are not sent to the safe or pharmacy.  [facility] is not responsible for loss, theft or damage of any property in my possession.    Signature:  _________________________________ Date: _______  Time: _____                                              Staff Signature:  ____________________________ Date: ________  Time: _____      2nd Staff person, if patient is unable/unwilling to sign:    Signature: ________________________________ Date: ________  Time: _____     Discharge:  [facility] has returned all of my personal belongings:    Signature: _________________________________ Date: ________  Time: _____                                          Staff Signature:  ____________________________ Date: ________  Time: _____

## 2024-08-13 NOTE — PLAN OF CARE
Group Attendance:  attended full group    Time session began: 1315  Time session ended: 1400  Patient's total time in group: 45    Total # Attendees   5   Group Type psychotherapeutic and psychoeducational     Group Topic Covered relationships and healthy coping skills     Group Session Detail Group Topic was conflict resolution. Participants discussed four types of conflict resolution styles and identified which one they use most. We discussed 4 scenarios and determined which resolution style was used in each and how to resolve each in team builder style.      Patient's response to the group topic/interactions:  cooperative with task, socially appropriate, listened actively, and actively engaged     Patient Details: Patient was calm, cooperative and engaged in the discussion.            47602 - Group psychotherapy - 1 Session  Patient Active Problem List   Diagnosis    Anxiety    Major depression    Suicidal ideation

## 2024-08-13 NOTE — PLAN OF CARE
Problem: Sleep Disturbance  Goal: Adequate Sleep/Rest  Outcome: Progressing   Goal Outcome Evaluation:  Shift Summary:    Patient appeared to sleep throughout NOC shift without incident. Monitored status 15. Approximate sleep hours: 5
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
## 2024-08-13 NOTE — PLAN OF CARE
"  Problem: Depressive Symptoms  Goal: Depressive Symptoms  Description: Signs and symptoms of listed problems will be absent or manageable.  Outcome: Not Progressing   Goal Outcome Evaluation:    Patient slept in this morning.  Awakened mid-morning.    Was not interested in breakfast as does not have much of an appetite.  Affect is flat, depressed,sad.    Reoriented to unit--provider, [facility], groups.  Passive thoughts of suicide but without intent or plan here in the hospital.  Endorses depression  (6) and anxiety (7).  Denies hallucinations.    Interested in groups. Now in music, movement group.    Concerned about taking medication--feels there is a \"stigma\" associated. Offered reassurance.  Attempted to reframe thinking in that if her friends were taking medication she would not feel less about her friends.  She agreed.    Ex-partner will come at sometime to get \"keys\" and patient is aware and ok with that.    "

## 2024-08-14 PROCEDURE — 128N000002 HC R&B CD/MH ADOLESCENT

## 2024-08-14 PROCEDURE — 97150 GROUP THERAPEUTIC PROCEDURES: CPT | Mod: GO

## 2024-08-14 PROCEDURE — 90832 PSYTX W PT 30 MINUTES: CPT

## 2024-08-14 PROCEDURE — 99232 SBSQ HOSP IP/OBS MODERATE 35: CPT | Performed by: REGISTERED NURSE

## 2024-08-14 PROCEDURE — 250N000013 HC RX MED GY IP 250 OP 250 PS 637: Performed by: REGISTERED NURSE

## 2024-08-14 RX ORDER — TRAZODONE HYDROCHLORIDE 100 MG/1
100 TABLET ORAL
Status: DISCONTINUED | OUTPATIENT
Start: 2024-08-14 | End: 2024-08-16 | Stop reason: HOSPADM

## 2024-08-14 RX ADMIN — FLUOXETINE HYDROCHLORIDE 10 MG: 10 CAPSULE ORAL at 08:18

## 2024-08-14 RX ADMIN — HYDROXYZINE HYDROCHLORIDE 50 MG: 25 TABLET, FILM COATED ORAL at 21:58

## 2024-08-14 ASSESSMENT — ACTIVITIES OF DAILY LIVING (ADL)
ADLS_ACUITY_SCORE: 28
ADLS_ACUITY_SCORE: 28
DRESS: SCRUBS (BEHAVIORAL HEALTH)
HYGIENE/GROOMING: INDEPENDENT
ADLS_ACUITY_SCORE: 28
DRESS: SCRUBS (BEHAVIORAL HEALTH)
ADLS_ACUITY_SCORE: 28
ORAL_HYGIENE: INDEPENDENT
HYGIENE/GROOMING: INDEPENDENT
ADLS_ACUITY_SCORE: 28
ORAL_HYGIENE: INDEPENDENT
ADLS_ACUITY_SCORE: 28
LAUNDRY: WITH SUPERVISION
ADLS_ACUITY_SCORE: 28
LAUNDRY: WITH SUPERVISION
ADLS_ACUITY_SCORE: 28

## 2024-08-14 NOTE — PLAN OF CARE
INITIAL OT ASSESSMENT      08/13/24 1500   General Information   Date Initially Attended OT 08/13/24   Clinical Impression   Affect Appropriate to situation   Orientation Oriented to person, place and time   Appearance and ADLs General cleanliness observed in most areas   Attention to Internal Stimuli No observed signs   Interaction Skills Interacts appropriately with staff;Interacts appropriately with peers   Ability to Communicate Needs Independent   Verbal Content Clear;Appropriate to topic   Ability to Maintain Boundaries Maintains appropriate physical boundaries;Maintains appropriate verbal boundaries   Participation Participates with minimal encouragement;Initiates participation   Concentration Concentrates 30+ minutes   Ability to Concentrate With structure   Follows and Comprehends Directions Independently follows multi-step directions   Memory Delayed and immediate recall intact   Organization Independently organizes all tasks   Decision Making Independent   Planning and Problem Solving Occasionally needs assist/feedback   Ability to Apply and Learn Concepts Comprehends concepts, but needs assist to apply   Frustrations / Stress Tolerance Independently identifies sources of frustration/stress   Level of Insight Some insight   Self Esteem Accepts positive feedback;Takes risks with support and encouragement;Can identify positives   Social Supports Has knowledge of support systems   [facility] OT Care Plan Goals   OT Care Plan coping with symptoms

## 2024-08-14 NOTE — PLAN OF CARE
Rehab Group    Start time: 1015  End time: 1100  Patient time total: 45 minutes    attended full group    #6 attended   Group Type: occupational therapy   Group Topic Covered: coping skills, mindfulness, and relaxation        Group Session Detail:  Stress Management & Mindfulness Exploration     Patient Response/Contribution:  cooperative with task, organized, socially appropriate, expressed understanding of topic, and actively engaged       Patient Detail:    Coping skill exploration group via a meditative creative expression task (zentangles) to promote focus, relaxation, mindfulness and resilience. Pt Response: Appropriate within group expectations. Demonstrated good attention and detail to task. Pt actively contributed to the discussion about mindfulness and use in daily activities. Appeared to achieve a relaxed state during this activity.         [id number] OT Group (2 or more in attendance)      Patient Active Problem List   Diagnosis    Anxiety    Major depression    Suicidal ideation

## 2024-08-14 NOTE — PROGRESS NOTES
[facility],  Psychiatric Progress Note        Interim History:   The patient's care was discussed with the treatment team and chart notes were reviewed.     Today during the interview with the treatment team staff report patient has been attending groups and therapy. Patient has been medication compliant.     Today patient was met in room. Patient reports they still have depression. They state passive suicidal thoughts but feel a little better. They report stress related to their housing situation and ex girlfriend. Patient and provider discussed planning and coping skills for future. Patient endorses anxiety related to stressors and life situations. Patient denies intrusive thoughts or hallucinations. Patient sleep was disrupted due to anxiety last night. Patient requested increase in trazodone for sleep prn.  Appetite stable. Patient remains at risk for self harm and continues to need stabilization. Patient reports no change in depression yet but did feel more energy this morning. Patient denies current side effects to medication.     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 Albumin high 5.3, protein total high 8.6. Other wise unremarkable. UTOX negative.      Im consults for acute medical concerns     Monitor for target symptoms.      Provide a safe environment and therapeutic milieu. Group programming as tolerated.      Medications:   Prozac 10 mg daily  PRNs: hydroxyzine 25-50 mg for anxiety, Zyprexa for agitation, trazodone for sleep    Attestation:  Patient has been seen and evaluated by me,  MAGDY Calderon CNP  The patient was counseled on  nature of illness and treatment plan/options  Care was coordinated with   unit RN  Total amount of time: >35 minutes  Coordination of care/counseling: 10 minutes         Medications:     Current Facility-Administered Medications   Medication Dose Route Frequency Provider Last Rate Last Admin    acetaminophen (TYLENOL) tablet 650 mg  650 mg Oral Q4H PRAlla Martin APRN CNP        alum & mag hydroxide-simethicone (MAALOX) suspension 30 mL  30 mL Oral Q4H PRN Alla Spencer APRN CNP        FLUoxetine (PROzac) capsule 10 mg  10 mg Oral Daily Nithin Mondragon APRN CNP   10 mg at 08/14/24 0818    hydrOXYzine HCl (ATARAX) tablet 25-50 mg  25-50 mg Oral Q4H PRN Nithin Mondragon APRN CNP   25 mg at 08/13/24 2150    ibuprofen (ADVIL/MOTRIN) tablet 200 mg  200 mg Oral Q4H PRN Alla Spencer APRN CNP        senna-docusate (SENOKOT-S/PERICOLACE) 8.6-50 MG per tablet 1 tablet  1 tablet Oral BID PRN Alla Spencer APRN CNP        traZODone (DESYREL) tablet 100 mg  100 mg Oral At Bedtime PRN Nithin Mondragon APRN CNP                 Allergies:   No Known Allergies         Psychiatric Examination:   BP (!) 142/92 (BP Location: Right arm, Patient Position: Sitting, Cuff Size: Adult Regular)   Pulse 79   Temp 98.9  F (37.2  C) (Temporal)   Resp 16   Wt 68.6 kg (151 lb 3.2 oz)   SpO2 100%   BMI 25.95 kg/m    Weight is 151 lbs 3.2 oz  Body mass index is 25.95 kg/m .    Appearance:  awake, alert, adequately groomed, and dressed in hospital scrubs  Attitude:  cooperative  Eye Contact:  fair  Mood:  anxious and depressed  Affect:  appropriate and in normal range  Speech:  clear, coherent  Psychomotor Behavior:  no evidence of tardive dyskinesia, dystonia, or tics  Thought Process:  goal oriented  Associations:  no loose associations  Thought Content:  passive suicidal ideation present, no auditory hallucinations present, and no visual hallucinations present  Insight:  fair  Judgment:  intact  Oriented to:  time, person, and place  Attention Span and Concentration:   intact  Recent and Remote Memory:  intact         Labs:   No results found for this or any previous visit (from the past 24 hour(s)).

## 2024-08-14 NOTE — PLAN OF CARE
"  Problem: Suicide Risk  Goal: Absence of Self-Harm  Description: Pt will remain safe on unit every shift as evidenced by identify and utilize 3 coping skills and adhere to medication therapy plan established; pt will approach staff if need assist and or difficulty managing disturbing thoughts or emotions.  Outcome: Progressing   Goal Outcome Evaluation:     Mental Health:  Pt presents with blunt/sad affect, rates both depression and anxiety 6/10/10 being worst, chronic SIB thoughts, but denies any current SI/SIB thoughts-contracts and able to keep self safe in hospital; pt reports having a difficult time sleeping last night and \"over thinking,\" pt reports he doesn't really have any coping skills on his own,and \"struggles with black and white thinking\"; pt states, \"I have been socializing and it has been helpful,\" pt agrees to attend groups today and begin exploring new coping skills; pt assisted by writer with supervised time on computer in order to access her travel schedule from work. Pt plays ping pong and socializing with peers; no unsafe or aggressive behavior.    Medical:  Pt denies any physical pain or acute medical concerns;     Vitals:   /83   Pulse 91   Temp 97.3  F (36.3  C) (Temporal)   Resp 16   Wt 68.6 kg (151 lb 3.2 oz)   SpO2 97%   BMI 25.95 kg/m         PRNs Given:  None    Continue to assess and monitor closely, no roommate and suicide precautions for safety.                      "

## 2024-08-14 NOTE — PLAN OF CARE
"Individual Therapy Note      Date of Service: August 14, 2024    Patient: Sherice goes by \"Sherice,\" uses she/her pronouns    Individuals Present: Kirsty Carrion [facility]    Session start: 1305  Session end: 1335  Session duration in minutes: 30      Modality Used: Rapport Building    Goals: Improve interpersonal relationship skills    Patient Description of current symptoms: general frustration and anger     Mental Status Exam:   Attitude: cooperative  Eye Contact: good  Mood: good  Affect: appropriate and in normal range  Speech: clear, coherent  Psychomotor Behavior: no evidence of tardive dyskinesia, dystonia, or tics  Thought Process:  logical  Associations: no loose associations  Thought Content: no evidence of suicidal ideation or homicidal ideation  Insight: fair  Judgement: intact  Attention Span and Concentration: intact    Pt progress: Patient reported a general frustration with anger toward everyone in her life. Patient shared their family background which included frequent moves, starting over in new schools and living with various relatives. Patient is experiencing grief over the break up with a partner she has been with 2-3 years. Patient reported being sad and depressed prior to this relationship. The break up occurred in June, but they are still living together. Patient is struggling with jealousy. Her partner told her she was an awful person. We discussed reasons her partner thinks this. Patient reported that the partner feel unheard and not listened to. Patient reported that they need a lot of reassurance because they lost so many people in their life. Patient reports feeling that she can't trust and that love is conditional. Patient feels they will lose everyone they love. She feels scared and insecure, which feeds into her anxiety. 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 She wants to delete all social media accounts and " james reset her phone to delete all photos and block numbers. Writer asked patient what she wants to achieve by doing this. She admitted it sounds like she is running from her problems. Her goal is to be happy. We discussed if she really needs to move to be happy. She defined happiness as stability, being loved, enjoy work with things to look forward to. What she feels is missing from her life is stability to love, a means for comfort, hugs, someone to talk to whenever. We decided to meet again to continue the discussion of improving interpersonal relationships.    Treatment Objective(s) Addressed:   The focus of this session was on rapport building, orienting the patient to therapy, and processing feelings related to grief and loss      Progress Towards Goals and Assessment of Patient:   Patient is making progress towards treatment goals as evidenced by engaging in treatment and gaining insight.       Therapeutic Intervention(s):   Provided active listening, unconditional positive regard, and validation.   Engaged in guided discovery, explored patient's perspectives and helped expand them through socratic dialogue and Using CBT tools and instruction to improve insight, awareness, identifying unhealthy patterns and self-talk and replacing with healthier patterns and self-talk      Plan/next step: Meet tomorrow to continue processing and work on safety planning    38100 - Psychotherapy (with patient) - 30 (16-37*) min    Patient Active Problem List   Diagnosis    Anxiety    Major depression    Suicidal ideation

## 2024-08-14 NOTE — PLAN OF CARE
Team Note Due:  Tuesday  Assessment/Intervention/Current Symptoms and Care Coordination  Chart review and met with team, discussed pt progress, symptomology, and response to treatment.  Discussed the discharge plan and any potential impediments to discharge.    CTC met with pt to discuss outpatient services.     CTC completed acuity rating    Discharge Plan or Goal  Pending stabilization & development of a safe discharge plan.    Dispo Plan:Return home with outpatient providers  Medications ordered/sent to:  Provisional Discharge required:     Barriers to Discharge   Patient requires further psychiatric stabilization due to current symptomology    Referral Status  Psychiatry and Therapy    Legal Status  Patient is voluntary        Contacts:        Upcoming Meetings and Dates/Important Information and next steps:  CTC will work with treatment team and pt to establish safe discharge plan

## 2024-08-14 NOTE — PLAN OF CARE
Problem: Suicide Risk  Goal: Absence of Self-Harm  8/13/2024 2236 by Yashira Serna RN  Outcome: Progressing  8/13/2024 2121 by Yashira Serna RN  Outcome: Progressing  Problem: Anxiety Signs/Symptoms  Goal: Optimized Energy Level (Anxiety Signs/Symptoms)  8/13/2024 2236 by Yashira Serna RN  Outcome: Progressing  8/13/2024 2121 by Yashira Serna RN  Outcome: Progressing   Goal Outcome Evaluation:      Plan of Care Reviewed With: patient  Pt is seen in the [facility], socializing with peers and playing cards. Patient reported that she feels better today than yesterday. Pt denies SI/SIB/depression/ hallucination and pain. The pt contracts for safety.   Calm and cooperative.  Intake- 80% for dinner with adequate intake of fluids.  Prn- hydroxyzine

## 2024-08-14 NOTE — PLAN OF CARE
Patient appeared asleep for 7hrs. Patient did not have any behavioral or medical concerns and remain on 15min checks. RN will continue to monitor.

## 2024-08-14 NOTE — PLAN OF CARE
Rehab Group    Start time: 1115  End time: 1200  Patient time total: 40 minutes    attended full group    #6 attended   Group Type: occupational therapy   Group Topic Covered: balanced lifestyle, cognitive activities, coping skills, healthy leisure time, problem solving, and social skills        Group Session Detail:  OT CLINIC     Patient Response/Contribution:  cooperative with task, organized, and actively engaged       Patient Detail:    Occupational therapy clinic to facilitate coping skill exploration, creative expression within personally meaningful activities, and clinical observation of social, cognitive, and kinesthetic performance skills. Pt response: Pt presented with pleasant mood, neutral affect. IND to initiate, gather materials, sequence, and adjust to workspace demands as needed for a multi-step creative expressive task. IND with all performance skills. Demonstrated good focus toward task. Pt was minimally engaged in conversation with peers but always greeted peers warmly upon their arrival to group.         [id number] OT Group (2 or more in attendance)      Patient Active Problem List   Diagnosis    Anxiety    Major depression    Suicidal ideation

## 2024-08-15 PROCEDURE — 90853 GROUP PSYCHOTHERAPY: CPT

## 2024-08-15 PROCEDURE — 90837 PSYTX W PT 60 MINUTES: CPT

## 2024-08-15 PROCEDURE — 97150 GROUP THERAPEUTIC PROCEDURES: CPT | Mod: GO

## 2024-08-15 PROCEDURE — 250N000013 HC RX MED GY IP 250 OP 250 PS 637: Performed by: REGISTERED NURSE

## 2024-08-15 PROCEDURE — 128N000002 HC R&B CD/MH ADOLESCENT

## 2024-08-15 PROCEDURE — 99232 SBSQ HOSP IP/OBS MODERATE 35: CPT | Performed by: REGISTERED NURSE

## 2024-08-15 RX ADMIN — TRAZODONE HYDROCHLORIDE 100 MG: 100 TABLET ORAL at 20:51

## 2024-08-15 RX ADMIN — FLUOXETINE HYDROCHLORIDE 10 MG: 10 CAPSULE ORAL at 09:22

## 2024-08-15 RX ADMIN — HYDROXYZINE HYDROCHLORIDE 25 MG: 25 TABLET, FILM COATED ORAL at 17:51

## 2024-08-15 ASSESSMENT — ACTIVITIES OF DAILY LIVING (ADL)
ADLS_ACUITY_SCORE: 28
DRESS: SCRUBS (BEHAVIORAL HEALTH)
ADLS_ACUITY_SCORE: 28
ORAL_HYGIENE: INDEPENDENT
LAUNDRY: WITH SUPERVISION
ADLS_ACUITY_SCORE: 28
HYGIENE/GROOMING: INDEPENDENT
ADLS_ACUITY_SCORE: 28

## 2024-08-15 NOTE — PLAN OF CARE
Team Note Due:  Tuesday  Assessment/Intervention/Current Symptoms and Care Coordination  Chart review and met with team, discussed pt progress, symptomology, and response to treatment.  Discussed the discharge plan and any potential impediments to discharge.      CTC tasked care coordinators with scheduling med management and therapy for pt.       CTC completed acuity rating    Discharge Plan or Goal  Pending stabilization & development of a safe discharge plan.    Dispo Plan:Return home with outpatient providers  Medications ordered/sent to:  Provisional Discharge required:     Barriers to Discharge   Patient requires further psychiatric stabilization due to current symptomology    Referral Status  Psychiatry and Therapy    Legal Status  Patient is voluntary        Contacts:        Upcoming Meetings and Dates/Important Information and next steps:  CTC will work with treatment team and pt to establish safe discharge plan

## 2024-08-15 NOTE — DISCHARGE INSTRUCTIONS
Behavioral Discharge Planning and Instructions    Summary: You were admitted on 8/12/2024  due to Suicidal Ideations.  You were treated by MAGDY Lopez CNP and Debra Naegele, APRN,CNP and discharged on 08/16/2024 from Young Adult to Home    Main Diagnosis:   Generalized anxiety disorder  Major depression recurrent moderate without psychotic features     Health Care Follow-up:     Individual Therapy appointment: Tuesday, September 3, 2024 @ 1:10pm In-person   Provider: Filomena Méndez MA, [facility], [facility]   [place] [address] [id number]  Phone: [phone]  Fax: [phone]  Notes: Intake paperwork to be completed prior to both appointments will be sent via email ([email or web address]).     Psychiatry Med Management appointment: Tuesday, September 10, 2024 @ 3:20pm In-person   Provider: Germania Hunter CNP  [place] [address] [address]  Phone: [phone]  Fax: [phone]  HUC TO FAX AVS to above appointment, please    Information will be faxed to your outpatient providers to ensure a healthy continuity of care for you.     Attend all scheduled appointments with your outpatient providers. Call at least 24 hours in advance if you need to reschedule an appointment to ensure continued access to your outpatient providers.     Major Treatments, Procedures and Findings:  You were provided with: a psychiatric assessment, assessed for medical stability, medication evaluation and/or management, group therapy, individual therapy, and milieu management    Symptoms to Report: feeling more aggressive, increased confusion, losing more sleep, mood getting worse, or thoughts of suicide    Early warning signs can include: increased depression or anxiety sleep disturbances increased thoughts or behaviors of suicide or self-harm  increased unusual thinking, such as paranoia or hearing voices    Safety and Wellness:  Take  all medicines as directed.  Make no changes unless your doctor suggests them.      Follow treatment recommendations.  Refrain from alcohol and non-prescribed drugs.  Ask your support system to help you reduce your access to items that could harm yourself or others. If there is a concern for safety, call 911.    Resources:   Mental Health Crisis Resources  Throughout [place]: call **CRISIS (**945194)  Crisis Text Line: is available for free, 24/7 by texting [place] to 402314  Suicide Awareness Voices of Education (SAVE) (www.save.org): 741-393-FEAO (2819)  The [place] Suicide Prevention Lifeline is now: 988 Suicide and Crisis Lifeline. Call 988 anytime.  National [place] on Mental Illness (www.[place].jaya.org): 513.953.8152 or 173-299-8476.  Sblj2uxhx: text the word LIFE to 65428 for immediate support and crisis intervention  Mental Health Consumer/Survivor Network of [place] (www.mhcsn.net): 457.709.8572 or 062-166-0321  Mental Health Association of [place] (www.mentalhealth.org): 534.694.7417 or 260-233-2128  Peer Support Connection [facility] (PSC) 1-418.803.9895 Available from 5pm - 9am (7 days a week/365 days a year)  Call or Text 988, [facility] 1-595-326-8044 Community Outreach for Psych Emergencies, or [facility] 1-403.555.6256 [facility] Mental Crisis Program      General Medication Instructions:   See your medication sheet(s) for instructions.   Take all medicines as directed.  Make no changes unless your doctor suggests them.   Go to all your doctor visits.  Be sure to have all your required lab tests. This way, your medicines can be refilled on time.  Do not use any drugs not prescribed by your doctor.  Avoid alcohol.    Advance Directives:   Scanned document on file with [facility]? No scanned doc  Is document scanned? Pt states no documents  Honoring Choices Your Rights Handout: Informed and given  Was more information offered? Materials given    The Treatment team has appreciated the opportunity to work  with you. If you have any questions or concerns about your recent admission, you can contact the unit which can receive your call 24 hours a day, 7 days a week. They will be able to get in touch with a Provider if needed. The unit number is [phone] .

## 2024-08-15 NOTE — PLAN OF CARE
Problem: Sleep Disturbance  Goal: Adequate Sleep/Rest  Outcome: Progressing   Goal Outcome Evaluation:      patient appears a sleep during checks.pt slept for 7  hours last night. pt on SI precaustion. no occurance of such behaviors and no behavioral or safty concern , no report/ indication of pain or discomfort noted. safty checks in place. Will continue to monitor and offer  support as needed

## 2024-08-15 NOTE — PLAN OF CARE
"Problem: Anxiety Signs/Symptoms  Goal: Optimized Energy Level (Anxiety Signs/Symptoms)  Outcome: Progressing  Intervention: Optimize Energy Level  Recent Flowsheet Documentation  Taken 8/15/2024 1600 by Pablo Jeffrey RN  Diversional Activity:   puzzles   music   television  Activity (Behavioral Health): activity encouraged     Problem: Depressive Symptoms  Goal: Depressive Symptoms  Description: Signs and symptoms of listed problems will be absent or manageable.  Outcome: Progressing   Goal Outcome Evaluation:    Pt was visible, and active in the milieu. Pt was observed working on puzzle in the dinning/lounge area.   Pt requested to speak with writer regarding a phone to his former partner. Pt was nervous and anxious. Pt checked in as doing okay, rated anxiety at 5/10 and depression at 3/10 with 10 being worst. Writer offered pt PRN hydrOXYzine HCl  tab 25 mg.  Pt was alert and oriented x 4, pleasant and cooperative with staff.   VS stable.   Affect is flat blunted. Pt reported mood as being better.  Pt reports good appetite, ate about 100% of dinner    Pt currently denies SI/SIB/HI - pt reports chronic SI/SIB - thoughts only w/o plan. Denies visual and auditory hallucinations. Pt's goal for today was to continue working on the puzzle in the lounge.    Pt checking in with writer after conclusion of phone to her former partner. Pt state \"it went well and better than I expect\".  Pt was medication compliant, denied pain, medication side effects and medical concerns.  "

## 2024-08-15 NOTE — PLAN OF CARE
"Individual Therapy Note      Date of Service: August 15, 2024    Patient: Sherice goes by \"Sherice,\" uses she/her pronouns    Individuals Present: Sherice Carcamo [facility]    Session start: 1125   Session end: 1235  Session duration in minutes: 70      Modality Used: CBT    Goals: Improve boundaries and self esteem    Patient Description of current symptoms: Feeling better     Mental Status Exam:   Attitude: cooperative  Eye Contact: good  Mood: better  Affect: appropriate and in normal range  Speech: clear, coherent  Psychomotor Behavior: no evidence of tardive dyskinesia, dystonia, or tics  Thought Process:  logical  Associations: no loose associations  Thought Content: no evidence of suicidal ideation or homicidal ideation  Insight: good  Judgement: intact  Attention Span and Concentration: intact    Pt progress: Patient has been reflecting on plans for discharge. Patient wants to re-establish contact with a friend. She plans to call her partner before discharging to ask that ex leave the apartment before patient returns and to remove all items that ex gave to patient. Patient plans to have a friend accompany her home. She doesn't want to see her ex-partner again and needs time to think. She is concerned that she will be overcome with emotion if she sees her ex. Patient reports they want to move toward acceptance. Patient is forward thinking and is anticipating that they may feel isolated on a 4 day work trip. Patient plans to connect with friends by phone while on the trip. We discussed connections with her work team as well. Patient also plans to use their free airline tickets to visit friends during their week off. Patient is anticipating how she will handle it if she sees her ex. She is planning not to look at her ex's social media and not go to places the ex likes to go. We discussed the grieving process and ways that the relationship has benefited the patient. Patient expressed frustration that she held back her " career to be with her ex and we talked about her value of the relationship over money. We discussed what patient learned from the relationship. Patient felt she may need to improve her ability to provide emotional support to others, be more trusting of partners and be open about how she experiences love. We identified how her fear of abandonment and feeling unwanted has impacted relationship and ways to get healing around it. We ended the session by completing a safety plan    Treatment Objective(s) Addressed:   The focus of this session was on safety planning, rapport building, processing feelings related to grief and loss, identifying an appropriate aftercare plan, building self-esteem, and identifying additional supports     Progress Towards Goals and Assessment of Patient:   Patient is making progress towards treatment goals as evidenced by planned discharge tomorrow.       Therapeutic Intervention(s):   Provided active listening, unconditional positive regard, and validation.   Engaged in safety planning identifying coping skills, warning signs, health support and resources, Engaged in guided discovery, explored patient's perspectives and helped expand them through socratic dialogue, and Using CBT tools and instruction to improve insight, awareness, identifying unhealthy patterns and self-talk and replacing with healthier patterns and self-talk      Plan/next step: Close out therapy tomorrow    07224 - Psychotherapy (with patient) - 60 (53+*) min    Patient Active Problem List   Diagnosis    Anxiety    Major depression    Suicidal ideation

## 2024-08-15 NOTE — PLAN OF CARE
"  Problem: Sleep Disturbance  Goal: Adequate Sleep/Rest  Description: Pt will receive adequate rest/sleep as evidenced by reports of feeling rested and 6-9 hours sleep recorded nightly    Outcome: Progressing   Goal Outcome Evaluation:    Plan of Care Reviewed With: patient           Mental Health:  Pt presents as clam, quiet, soft spoken, endorses anxiety, depression and no current SI/SIB thoughts, \"usually later in the day,\" med compliant, no observed or reported side effects; pt in lounge for breakfast without her wig on and pt feels that hospitalization groups and therapy have all been very helpful; thoughts are clear/logical and organized; pt continues to attend and be engaged in programming, independent with ADL's. No behavior concerns. Continue to encourage prn meds for sleep and review of sleep hygiene during day to promote sleep at night. Pt unfortunately reports still needing to have hope instilled with reassurance, pt informed about spiritual consult as may be helpful during the weekend if remains in hospital. Pt agrees to think about it.  Medical:  Pt denies any physical pain; appetite and sleep, \"unsure if better,\" pt drinking fluids and eating about 50% of meals.     Vitals:   /81 (BP Location: Left arm)   Pulse 109   Temp 97.5  F (36.4  C) (Temporal)   Resp 16   Wt 68.6 kg (151 lb 3.2 oz)   SpO2 98%   BMI 25.95 kg/m         PRNs Given:  NOne    Continue to assess and monitor closely, no roommate and suicide precautions for safety            "

## 2024-08-15 NOTE — PLAN OF CARE
Rehab Group    Start time: 1415  End time: 1500  Patient time total: 45 minutes    attended full group    #7 attended   Group Type: occupational therapy   Group Topic Covered: balanced lifestyle, coping skills, problem solving, and self-care       Group Session Detail:  Life Skills: Goal Setting     Patient Response/Contribution:  cooperative with task, expressed readiness to alter behaviors, supportive of peers, listened actively, expressed understanding of topic, and actively engaged       Patient Detail:    Mental health management group designed to promote insight, self-reflection, self-esteem and goal-setting. Pt Response: Pt was engaged and able to follow and complete the 2-part activity independently. Pt contributed reflections to the discussion and appeared comfortable sharing goals and ideas with group members. Pt shared that she is working on self-acceptance and demonstrated confidence/desire in her ability to continue this progress upon discharge.         [id number] OT Group (2 or more in attendance)      Patient Active Problem List   Diagnosis    Anxiety    Major depression    Suicidal ideation

## 2024-08-15 NOTE — PROGRESS NOTES
"[facility],  Psychiatric Progress Note        Interim History:   The patient's care was discussed with the treatment team and chart notes were reviewed.     Today during the interview with the treatment team staff report patient has been attending groups and therapy. Patient has been medication compliant.     Today patient was met in room. Patient reports they still have depression but it has improved. They deny current suicidal thoughts and are working on safety plan with therapist. Patient endorses anxiety but has been using more coping skills. Patient denies intrusive thoughts or hallucinations. Patient sleep \"toss and turned\" last night. Patient forgot to request the trazodone for sleep prn.  Appetite stable. Patient denies current side effects to medication.     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 Albumin high 5.3, protein total high 8.6. Other wise unremarkable. UTOX negative.      Im consults for acute medical concerns     Monitor for target symptoms.      Provide a safe environment and therapeutic milieu. Group programming as tolerated.      Medications:   Prozac 10 mg daily  PRNs: hydroxyzine 25-50 mg for anxiety, Zyprexa for agitation, trazodone for sleep    Attestation:  Patient has been seen and evaluated by me,  MAGDY Calderon CNP  The patient was counseled on  nature of illness and treatment plan/options  Care was coordinated with  unit RN  Total amount of time: >35 minutes  Coordination of care/counseling: 10 minutes         Medications:     Current Facility-Administered Medications   Medication Dose Route Frequency Provider Last Rate Last Admin    acetaminophen (TYLENOL) tablet 650 mg  650 mg Oral " Q4H PRN Alla Spencer APRN CNP        alum & mag hydroxide-simethicone (MAALOX) suspension 30 mL  30 mL Oral Q4H PRN Alla Spencer APRN CNP        FLUoxetine (PROzac) capsule 10 mg  10 mg Oral Daily Nithin Mondragon APRN CNP   10 mg at 08/15/24 0922    hydrOXYzine HCl (ATARAX) tablet 25-50 mg  25-50 mg Oral Q4H PRN Nithin Mondragon APRN CNP   50 mg at 08/14/24 2158    ibuprofen (ADVIL/MOTRIN) tablet 200 mg  200 mg Oral Q4H PRN Alla Spencer APRN CNP        senna-docusate (SENOKOT-S/PERICOLACE) 8.6-50 MG per tablet 1 tablet  1 tablet Oral BID PRAlla Martin APRN CNP        traZODone (DESYREL) tablet 100 mg  100 mg Oral At Bedtime PRN Nithin Mondragon APRN CNP                 Allergies:   No Known Allergies         Psychiatric Examination:   /81 (BP Location: Left arm)   Pulse 109   Temp 97.5  F (36.4  C) (Temporal)   Resp 16   Wt 68.6 kg (151 lb 3.2 oz)   SpO2 98%   BMI 25.95 kg/m    Weight is 151 lbs 3.2 oz  Body mass index is 25.95 kg/m .    Appearance:  awake, alert, adequately groomed, and dressed in hospital scrubs  Attitude:  cooperative  Eye Contact:  fair  Mood:  better  Affect:  appropriate and in normal range  Speech:  clear, coherent  Psychomotor Behavior:  no evidence of tardive dyskinesia, dystonia, or tics  Thought Process:  goal oriented  Associations:  no loose associations  Thought Content:  no auditory hallucinations present and no visual hallucinations present  Insight:  fair  Judgment:  intact  Oriented to:  time, person, and place  Attention Span and Concentration:  intact  Recent and Remote Memory:  intact         Labs:   No results found for this or any previous visit (from the past 24 hour(s)).

## 2024-08-15 NOTE — PROGRESS NOTES
Rehab Group    Start time: 1030  End time: 1130  Patient time total: 60 minutes    attended full group    #5 attended   Group Type: dance movement   Group Topic Covered: balanced lifestyle       Group Session Detail:  Group participated in a progressive body awareness and gently opening.  Therapeutic progress continued with attunement and ultimately creative self-expression within a supportive environment.     Patient Response/Contribution:  positive affect, cooperative with task, supportive of peers, socially appropriate, and attentive       Patient Detail:    Pt was still passively engaged but responsive to verbal prompting.  They wore no makeup or wig to this session as in previous dance therapy session.  Pt expressed gratitude for the group.      Group Therapy [id number]      Patient Active Problem List   Diagnosis    Anxiety    Major depression    Suicidal ideation

## 2024-08-16 VITALS
TEMPERATURE: 97.7 F | HEART RATE: 95 BPM | RESPIRATION RATE: 16 BRPM | DIASTOLIC BLOOD PRESSURE: 87 MMHG | BODY MASS INDEX: 25.95 KG/M2 | SYSTOLIC BLOOD PRESSURE: 127 MMHG | OXYGEN SATURATION: 100 % | WEIGHT: 151.2 LBS

## 2024-08-16 PROCEDURE — 97150 GROUP THERAPEUTIC PROCEDURES: CPT | Mod: GO

## 2024-08-16 PROCEDURE — 250N000013 HC RX MED GY IP 250 OP 250 PS 637: Performed by: REGISTERED NURSE

## 2024-08-16 PROCEDURE — 99239 HOSP IP/OBS DSCHRG MGMT >30: CPT | Performed by: CLINICAL NURSE SPECIALIST

## 2024-08-16 RX ORDER — TRAZODONE HYDROCHLORIDE 100 MG/1
100 TABLET ORAL
Qty: 30 TABLET | Refills: 1 | Status: SHIPPED | OUTPATIENT
Start: 2024-08-16

## 2024-08-16 RX ORDER — FLUOXETINE 10 MG/1
10 CAPSULE ORAL DAILY
Qty: 30 CAPSULE | Refills: 1 | Status: SHIPPED | OUTPATIENT
Start: 2024-08-17

## 2024-08-16 RX ORDER — HYDROXYZINE HYDROCHLORIDE 25 MG/1
25-50 TABLET, FILM COATED ORAL EVERY 4 HOURS PRN
Qty: 60 TABLET | Refills: 1 | Status: SHIPPED | OUTPATIENT
Start: 2024-08-16

## 2024-08-16 RX ADMIN — FLUOXETINE HYDROCHLORIDE 10 MG: 10 CAPSULE ORAL at 08:16

## 2024-08-16 ASSESSMENT — ACTIVITIES OF DAILY LIVING (ADL)
ADLS_ACUITY_SCORE: 28
ORAL_HYGIENE: INDEPENDENT
ADLS_ACUITY_SCORE: 28
LAUNDRY: WITH SUPERVISION
ADLS_ACUITY_SCORE: 28
HYGIENE/GROOMING: INDEPENDENT
ADLS_ACUITY_SCORE: 28

## 2024-08-16 NOTE — PLAN OF CARE
"  Rehab Group    Start time: 1015  End time: 1200  Patient time total: 105 minutes    attended full group    #7 attended   Group Type: OT Clinic   Group Topic Covered: cognitive activities, coping skills, and healthy leisure time   Group Session Detail: OT: Education on healthy activity engagement and creative hands-on endeavor (OT clinic) to increase concentration, focus, attention to task/detail, decision making, problem solving, frustration tolerance, task follow through, coping with stress, healthy leisure engagement, creative expression, and social engagement    Patient Response/Contribution:  cooperative with task, actively engaged, and pleasant, engaged on approach   Patient Detail: Pt reported during check-in that \"doing the puzzle out there\" (in the lounge is something that is going well for them recently. Pt sat among peers to complete familiar hands on creative endeavor but did not engage in social interactions with peers; pt engaged in brief social interactions with therapist on approach. Pt's lack of socialization may have been due to pt and peer's focus on task. Pt shared two drawings and poems they created in group. Pt reported they wish they could have a pet but their current job as a  doesn't allow for that commitment at this time.       [id number] OT Group (2 or more in attendance)    Patient Active Problem List   Diagnosis    Anxiety    Major depression    Suicidal ideation         "

## 2024-08-16 NOTE — PLAN OF CARE
BEH IP Unit Acuity Rating Score (UARS)  Patient is given one point for every criteria they meet.    CRITERIA SCORING   On a 72 hour hold, court hold, committed, stay of commitment, or revocation. 0    Patient LOS on BEH unit exceeds 20 days. 0  LOS: 4   Patient under guardianship, 55+, otherwise medically complex, or under age 11. 0   Suicide ideation without relief of precipitating factors. 0   Current plan for suicide. 0   Current plan for homicide. 0   Imminent risk or actual attempt to seriously harm another without relief of factors precipitating the attempt. 0   Severe dysfunction in daily living (ex: complete neglect for self care, extreme disruption in vegetative function, extreme deterioration in social interactions). 0   Recent (last 7 days) or current physical aggression in the ED or on unit. 0   Restraints or seclusion episode in past 72 hours. 0   Recent (last 7 days) or current verbal aggression, agitation, yelling, etc., while in the ED or unit. 0   Active psychosis. 0   Need for constant or near constant redirection (from leaving, from others, etc).  0   Intrusive or disruptive behaviors. 0   Patient requires 3 or more hours of individualized nursing care per 8-hour shift (i.e. for ADLs, meds, therapeutic interventions). 0   TOTAL 0

## 2024-08-16 NOTE — PLAN OF CARE
Care Coordination Note    Tasks Completed:     Care Coordinator scheduled the following appointments:     Individual Therapy appointment: Tuesday, September 3, 2024 @ 1:10pm In-person   Provider: Filomena Méndez MA, LPC, LADC   [place] [address], [place] [address]  Phone: [phone]  Fax: [phone]  Notes: Intake paperwork to be completed prior to both appointments will be sent via email ([email or web address]).     Psychiatry Med Management appointment: Tuesday, September 10, 2024 @ 3:20pm In-person   Provider: Germania Hunter CNP  Location: [address], [place] [address]  Phone: [phone]  Fax: [phone]  HUC TO FAX AVS to above appointment, please     CC submitted a referral to [facility] via fax for the purpose of appointment follow-up.     CC updated CTC and AVS

## 2024-08-16 NOTE — DISCHARGE SUMMARY
"Psychiatric Discharge Summary    Hosea Isaacs MRN# [id number]   Age: 24 year old YOB: 2000     Date of Admission:  8/12/2024  Date of Discharge:  8/16/2024  Admitting Physician:  Pierre Vasques MD  Discharge Physician:  Debra A. Naegele, APRN CNS (Contact: [phone])         Event Leading to Hospitalization:     Per ED note by Malcolm Powell MD on 8/12/24  Hosea Isaacs is a 24 year old male who presents with concern about feeling depressed with thoughts of self-harm.     Reviewed the medical record.  Office visit on 7/23/24-  diagnosis of adjustment disorder with mixed anxiety and depressed mood     On examination patient reports she feels depressed and that she has had thoughts of self-harm including jumping off a bridge or becoming a victim of a hate crime while intoxicated. Patient arrived by car alone from home in [place], [place] after reporting that she had called the crisis line.  She reports that she found some \"raunchy text messages\" on his ex-partner's phone last night while in bed.  She reports that she works as a  for [employer].  Originally from [facility] moved to [place] 3 years ago.  Due to infidelity with text messages seen on partner's phone yesterday she reports this made her feel depressed and ever since has had thoughts of ending her own life.  After conversation with the crisis line she drove to the emergency department for further care.           Per DEC assessment:  Hosea Isaacs presents to the ED by  self. Patient is presenting to the ED for the following concerns: Suicidal ideation, Anxiety.   Factors that make the mental health crisis life threatening or complex are:  Pt is a transgender female who prefers to go by Sherice and uses she/her pronouns.  She states feeling overwhlmed, anxious nothing to look forward to in this life.  Pt reports last night she had intent to go to the bar have drinks, to get the courage to jump off a " "bridge or was going to walk home after bar close by herself, knowing this would make her avulnerable target.  Pt reports a recent stressor being she and her ex-girlfriend broke up and are still living together.  Pt reports she found text messages on her ex's phone that were upsetting.  She also indicates feeling increased loneliness at her job as a .  Pt states this morning she reports going to the park and listened to music to try and feel better.  She also reports trying to make plans with friends and couldn't get ahold of anyone.  She states she ended up driving and found herself in a parking lot in [place], crying.  She called crisis line and they recommended she go to the ED.  Pt continues to report hopelessness while in the ED, stating, \"I just don't know what I'd be going back to\". Pt reports she doesn't have family here in [place], no close friends.  She states, \"It makes me anxious to think about existing\".  Pt is currently denying SIB/HI or hallucinations.  She is calm and cooperative throughout interview and at times tearful..    This patient is a 24 year old  adult with a significant past psychiatric history of  depression and anxiety who presents with suicidal ideations. Patient currently living with her ex and saw some texts on their phone and became suicidal. She reported feelings of isolation, depression, and social stressors with friends that led up to calling crisis line who recommended to come into ED. Since admission to unit 6A she has less suicidal thoughts and is willing to start Prozac for depression. She is interested in unit therapy.        See Admission note by Nihtin Mondragon APRN CN  on 8/13/2024  6:32 AM  for additional details.          DIagnoses:     Generalized anxiety disorder  Major depression recurrent moderate without psychotic features         Labs:     Results for orders placed or performed during the hospital encounter of 08/12/24   Hemoglobin A1c     Status: " Normal   Result Value Ref Range    Hemoglobin A1C 5.5 <5.7 %   Lipid panel     Status: Normal   Result Value Ref Range    Cholesterol 127 <200 mg/dL    Triglycerides 85 <150 mg/dL    Direct Measure HDL 42 >=40 mg/dL    LDL Cholesterol Calculated 68 <=100 mg/dL    Non HDL Cholesterol 85 <130 mg/dL    Narrative    Cholesterol  Desirable:  <200 mg/dL    Triglycerides  Normal:  Less than 150 mg/dL  Borderline High:  150-199 mg/dL  High:  200-499 mg/dL  Very High:  Greater than or equal to 500 mg/dL    Direct Measure HDL  Female:  Greater than or equal to 50 mg/dL   Male:  Greater than or equal to 40 mg/dL    LDL Cholesterol  Desirable:  <100mg/dL  Above Desirable:  100-129 mg/dL   Borderline High:  130-159 mg/dL   High:  160-189 mg/dL   Very High:  >= 190 mg/dL    Non HDL Cholesterol  Desirable:  130 mg/dL  Above Desirable:  130-159 mg/dL  Borderline High:  160-189 mg/dL  High:  190-219 mg/dL  Very High:  Greater than or equal to 220 mg/dL             Consults:   No consultations were requested during this admission         Hospital Course:   Hosea Isaacs was admitted to Station 6AE with attending Pierre Vasques MD as a voluntary patient. The patient was placed under status 15 (15 minute checks) to ensure patient safety.     Prozac 10 mg daily to address depressive and anxiety symptoms  PRNs: hydroxyzine 25-50 mg for anxiety, trazodone for sleep   Provider discussed risks, benefits and side effects of medications with patient.     Hosea Isaacs did participate in groups and was visible in the milieu.     The patient's symptoms of suicidal ideation improved. Patient reports improved mood and denies suicidal ideation. Patient has protective factors of seeking out treatment and supportive friends.     Hosea Isaacs was released to home. At the time of discharge Hosea Isaacs was determined to not be a danger to herself or others.          Discharge Medications:     Current Discharge Medication List         START taking these medications    Details   FLUoxetine (PROZAC) 10 MG capsule Take 1 capsule (10 mg) by mouth daily  Qty: 30 capsule, Refills: 1    Associated Diagnoses: Recurrent major depressive disorder, in partial remission (H24); Anxiety      hydrOXYzine HCl (ATARAX) 25 MG tablet Take 1-2 tablets (25-50 mg) by mouth every 4 hours as needed for anxiety  Qty: 60 tablet, Refills: 1    Associated Diagnoses: Anxiety      traZODone (DESYREL) 100 MG tablet Take 1 tablet (100 mg) by mouth nightly as needed for sleep (may repeat after 60 minutes)  Qty: 30 tablet, Refills: 1    Associated Diagnoses: Insomnia due to other mental disorder           CONTINUE these medications which have NOT CHANGED    Details   ibuprofen (ADVIL/MOTRIN) 200 MG tablet Take 200 mg by mouth every 4 hours as needed for pain                  Psychiatric Examination:   Appearance:  awake, alert and adequately groomed  Attitude:  cooperative  Eye Contact:  good  Mood:  good  Affect:  appropriate and in normal range  Speech:  clear, coherent  Psychomotor Behavior:  no evidence of tardive dyskinesia, dystonia, or tics  Thought Process:  logical, linear, and goal oriented  Associations:  no loose associations  Thought Content:  no evidence of suicidal ideation or homicidal ideation, no auditory hallucinations present, and no visual hallucinations present  Insight:  good  Judgment:  intact  Oriented to:  time, person, and place  Attention Span and Concentration:  intact  Recent and Remote Memory:  intact  Language: Able to name objects, Able to repeat phrases, and Able to read and write  Fund of Knowledge: appropriate  Muscle Strength and Tone: normal  Gait and Station: Normal         Discharge Plan:       Further instructions from your care team         Behavioral Discharge Planning and Instructions    Summary: You were admitted on 8/12/2024  due to Suicidal Ideations.  You were treated by Nithin Mondragon, APRN,CNP and Debra Naegele, APRN,CNP and  discharged on 08/16/2024 from Young Adult to Home    Main Diagnosis:   Generalized anxiety disorder  Major depression recurrent moderate without psychotic features     Health Care Follow-up:     Individual Therapy appointment: Tuesday, September 3, 2024 @ 1:10pm In-person   Provider: Filomena Méndez MA, LPC, LADC   Location: [address], [place] [id number]  Phone: [phone]  Fax: [phone]  Notes: Intake paperwork to be completed prior to both appointments will be sent via email ([email or web address]).     Psychiatry Med Management appointment: Tuesday, September 10, 2024 @ 3:20pm In-person   Provider: Germania Hunter CNP  [place] [address] [address]  Phone: [phone]  Fax: [phone]  HUC TO FAX AVS to above appointment, please    Information will be faxed to your outpatient providers to ensure a healthy continuity of care for you.     Attend all scheduled appointments with your outpatient providers. Call at least 24 hours in advance if you need to reschedule an appointment to ensure continued access to your outpatient providers.     Major Treatments, Procedures and Findings:  You were provided with: a psychiatric assessment, assessed for medical stability, medication evaluation and/or management, group therapy, individual therapy, and milieu management    Symptoms to Report: feeling more aggressive, increased confusion, losing more sleep, mood getting worse, or thoughts of suicide    Early warning signs can include: increased depression or anxiety sleep disturbances increased thoughts or behaviors of suicide or self-harm  increased unusual thinking, such as paranoia or hearing voices    Safety and Wellness:  Take all medicines as directed.  Make no changes unless your doctor suggests them.      Follow treatment recommendations.  Refrain from alcohol and non-prescribed drugs.  Ask your support system to  help you reduce your access to items that could harm yourself or others. If there is a concern for safety, call 911.    Resources:   Mental Health Crisis Resources  Throughout [place]: call **CRISIS (**913647)  Crisis Text Line: is available for free, 24/7 by texting [place] to 695630  Suicide Awareness Voices of Education (SAVE) (www.save.org): 806-254-EVSC (9285)  The National Suicide Prevention Lifeline is now: 988 Suicide and Crisis Lifeline. Call 988 anytime.  National [place] on Mental Illness (www.[place].jaya.org): 582.762.4846 or 154-434-7613.  Jbjl4zmbp: text the word LIFE to 67887 for immediate support and crisis intervention  Mental Health Consumer/Survivor Network of [place] (www.mhcsn.net): 838.521.6359 or 048-844-2274  Mental Health Association of [place] (www.mentalhealth.org): 795.123.8577 or 135-488-1486  Peer Support Connection [place] Warmline ([facility]) 1-757.834.1773 Available from 5pm - 9am (7 days a week/365 days a year)  Call or Text 988, [facility] 1-104.328.7802 Community Outreach for Psych Emergencies, or Psychiatric 1-844.325.2551 Psychiatric Mental Crisis Program      General Medication Instructions:   See your medication sheet(s) for instructions.   Take all medicines as directed.  Make no changes unless your doctor suggests them.   Go to all your doctor visits.  Be sure to have all your required lab tests. This way, your medicines can be refilled on time.  Do not use any drugs not prescribed by your doctor.  Avoid alcohol.    Advance Directives:   Scanned document on file with [facility]? No scanned doc  Is document scanned? Pt states no documents  Honoring Choices Your Rights Handout: Informed and given  Was more information offered? Materials given    The Treatment team has appreciated the opportunity to work with you. If you have any questions or concerns about your recent admission, you can contact the unit which can receive your call 24 hours a day, 7 days a week. They will be able to get in touch  with a Provider if needed. The unit number is [phone] .             Attestation:  The patient has been seen and evaluated by me,  Debra A. Naegele, APRN CNS on 8/16/2024  Discharge summary time > 30 minutes

## 2024-08-16 NOTE — PLAN OF CARE
"  Rehab Group    Start time: 1315  End time: 1415  Patient time total: 50 minutes    attended full group    #8 attended   Group Type: occupational therapy   Group Topic Covered: cognitive activities and healthy leisure time   Group Session Detail: OT: Education on 8 Dimensions of Wellness with interactive social activities (Charades & Pictionary) to increase concentration, focus, attention to task/detail, memory recall, abstract & concrete thinking, healthy leisure engagement, coping with stress, health distraction engagement, social wellness, and cognitive wellness   Patient Response/Contribution:  cooperative with task, actively engaged, and pleasant, enaged   Patient Detail: pt reported during check-in that \"puzzles\" is an activity they enjoy to support their intellectual wellness. Pt sat among peers to complete presented activity and engaged in social interactions with peers. Pt was an active participant as both a presenter and a guesser; pt able to successfully identify a few responses. Pt appeared to enjoy the activity as they were observed to smile and laugh during group. Pt pulled from group early to complete belongings check prior to discharge. Pt thanked therapist and reported they enjoyed group.      [id number] OT Group (2 or more in attendance)    Patient Active Problem List   Diagnosis    Anxiety    Major depression    Suicidal ideation         "

## 2024-08-16 NOTE — PLAN OF CARE
Problem: Sleep Disturbance  Goal: Adequate Sleep/Rest  Description: Pt will receive adequate rest/sleep as evidenced by reports of feeling rested and 6-9 hours sleep recorded nightly  Outcome: Progressing   Goal Outcome Evaluation:       Problem: Anxiety Signs/Symptoms  Goal: Improved Sleep (Anxiety Signs/Symptoms)  Outcome: Progressing  Intervention: Promote Healthy Sleep Hygiene  Recent Flowsheet Documentation  Taken 8/16/2024 0000 by Rima Zamarripa RN  Sleep Hygiene Promotion:   awakenings minimized   noise level reduced   regular sleep pattern promoted       Pt slept 5.75 hrs this shift. Safety Rounds completed every 15 minutes throughout the night. Visible respirations noted with no signes of distress. No safety or behavior concerns noted this shift. Will continue to monitor and provide support as needed.

## 2024-08-16 NOTE — PLAN OF CARE
Problem: Suicide Risk  Goal: Absence of Self-Harm  Description: Pt will remain safe on unit every shift as evidenced by identify and utilize 3 coping skills and adhere to medication therapy plan established; pt will approach staff if need assist and or difficulty managing disturbing thoughts or emotions.  Outcome: Met  Intervention: Promote Psychosocial Wellbeing  Recent Flowsheet Documentation  Taken 8/16/2024 1300 by Ana Nichols RN  Family/Support System Care:   other (see comments)   support provided   self-care encouraged  Intervention: Establish Safety Plan and Continuity of Care  Recent Flowsheet Documentation  Taken 8/16/2024 1300 by Ana Nichols, RN  Safe Transition Promotion: protective factors promoted   Goal Outcome Evaluation:    Pt discharged home at 1510. Discharge instructions were reviewed by writer. Pt verbalized  understanding discharge orders. Stated that she will remain safe in the community, will take all her meds as ordered, and that she will follow up with aftercare appointments. Pt denied SI, HI, and hallucinations. Pt took all her personal belongings and discharge medications including her cell phone. Was also given a return to work note.

## 2024-08-16 NOTE — PLAN OF CARE
Team Note Due:  Tuesday  Assessment/Intervention/Current Symptoms and Care Coordination  Chart review and met with team, discussed pt progress, symptomology, and response to treatment.  Discussed the discharge plan and any potential impediments to discharge.    CTC wrote work letter for pt.       CTC completed acuity rating    Discharge Plan or Goal  Pending stabilization & development of a safe discharge plan.    Dispo Plan:Return home with outpatient providers  Medications ordered/sent to: Unit  Provisional Discharge required: No    Barriers to Discharge   None   Referral Status  Psychiatry and Therapy    Legal Status  Patient is voluntary        Contacts:   Individual Therapy appointment: Tuesday, September 3, 2024 @ 1:10pm In-person     Psychiatry Med Management appointment: Tuesday, September 10, 2024 @ 3:20pm In-person      Upcoming Meetings and Dates/Important Information and next steps:

## 2024-11-06 ASSESSMENT — PATIENT HEALTH QUESTIONNAIRE - PHQ9
10. IF YOU CHECKED OFF ANY PROBLEMS, HOW DIFFICULT HAVE THESE PROBLEMS MADE IT FOR YOU TO DO YOUR WORK, TAKE CARE OF THINGS AT HOME, OR GET ALONG WITH OTHER PEOPLE: NOT DIFFICULT AT ALL
SUM OF ALL RESPONSES TO PHQ QUESTIONS 1-9: 2
SUM OF ALL RESPONSES TO PHQ QUESTIONS 1-9: 2

## 2024-11-07 ENCOUNTER — TELEPHONE (OUTPATIENT)
Dept: FAMILY MEDICINE | Facility: CLINIC | Age: 24
End: 2024-11-07

## 2024-11-07 ENCOUNTER — OFFICE VISIT (OUTPATIENT)
Dept: FAMILY MEDICINE | Facility: CLINIC | Age: 24
End: 2024-11-07
Payer: COMMERCIAL

## 2024-11-07 VITALS
HEIGHT: 64 IN | DIASTOLIC BLOOD PRESSURE: 68 MMHG | OXYGEN SATURATION: 99 % | BODY MASS INDEX: 26.24 KG/M2 | WEIGHT: 153.7 LBS | TEMPERATURE: 98.1 F | SYSTOLIC BLOOD PRESSURE: 116 MMHG | HEART RATE: 77 BPM | RESPIRATION RATE: 15 BRPM

## 2024-11-07 DIAGNOSIS — Z11.4 SCREENING FOR HIV (HUMAN IMMUNODEFICIENCY VIRUS): ICD-10-CM

## 2024-11-07 DIAGNOSIS — Z11.59 NEED FOR HEPATITIS C SCREENING TEST: ICD-10-CM

## 2024-11-07 DIAGNOSIS — F64.0 GENDER DYSPHORIA IN ADULT: Primary | ICD-10-CM

## 2024-11-07 PROCEDURE — 90480 ADMN SARSCOV2 VAC 1/ONLY CMP: CPT | Performed by: FAMILY MEDICINE

## 2024-11-07 PROCEDURE — 90471 IMMUNIZATION ADMIN: CPT | Performed by: FAMILY MEDICINE

## 2024-11-07 PROCEDURE — 91320 SARSCV2 VAC 30MCG TRS-SUC IM: CPT | Performed by: FAMILY MEDICINE

## 2024-11-07 PROCEDURE — 90656 IIV3 VACC NO PRSV 0.5 ML IM: CPT | Performed by: FAMILY MEDICINE

## 2024-11-07 PROCEDURE — 99203 OFFICE O/P NEW LOW 30 MIN: CPT | Mod: 25 | Performed by: FAMILY MEDICINE

## 2024-11-07 NOTE — TELEPHONE ENCOUNTER
----- Message from CALLY HENAO sent at 11/7/2024 11:53 AM CST -----  Regarding: My auth appt  Hello.  Please give Sherice the Wednesday the 13th auth appt at 1:30. Patient is aware.  Thanks!  Cally

## 2024-11-07 NOTE — PROGRESS NOTES
"  Assessment & Plan   The longitudinal plan of care for the diagnosis(es)/condition(s) as documented were addressed during this visit. Due to the added complexity in care, I will continue to support Sherice in the subsequent management and with ongoing continuity of care.  Gender dysphoria in adult  We discussed forms of estradiol, antiandrogens, goals, expectations and timing. Discussed voice training referral and shown Voicet[employer].  She is in therapy where gender is discussed.  They are out locally and at work and dresses feminine.  Discussed the frequency of labs.  She will follow-up next week. She is going to decide on the form of estradiol. If she chooses injections, she will let me know so I can order the supplies for her and she can bring them to the appointment to be trained injections before starting them at home.  BMI  Estimated body mass index is 26.38 kg/m  as calculated from the following:    Height as of this encounter: 1.626 m (5' 4\").    Weight as of this encounter: 69.7 kg (153 lb 11.2 oz).   Chente Smiley is a 24 year old, presenting for the following health issues:  Consult (Discuss HRT therapy)        11/7/2024    10:47 AM   Additional Questions   Roomed by Irena     In middle school told her friend that she might be a girl. It was not taken positively. She could not come out to her family. She would go out of town, she would take skirts.  Started doing drag and really likes being feminine.  In the psych [facility], It is gender affirming. Afterward came out at work (.) She is happier now.   Not out to parents because they have a bad relationship. She thinks the siblings would be supportive. They are in [place]. Has a transgender worman group she goes to weekly.  In counseling and talks about transitioning. Has a psychiatrist.  Goals: Feel more comfortable in her body. Doesn't want to get old and see her dad.   No hair removal. Has not decided on surgeries, but looked into FFS for " "the future.       Objective    /68   Pulse 77   Temp 98.1  F (36.7  C) (Oral)   Resp 15   Ht 1.626 m (5' 4\")   Wt 69.7 kg (153 lb 11.2 oz)   SpO2 99%   BMI 26.38 kg/m    Body mass index is 26.38 kg/m .  Physical Exam   GENERAL: healthy, alert and no distress  EYES: grossly normal to inspection, and conjunctivae and sclerae normal  RESP: breathing unlabored, no cough or throat clearing.  MS: no gross musculoskeletal defects noted.  SKIN: no suspicious lesions or rashes visible  NEURO: Normal strength and tone, mentation intact and speech normal  PSYCH: mentation appears normal, affect normal/bright   Signed Electronically by: CALLY HENAO MD    Answers submitted by the patient for this visit:  Patient Health Questionnaire (Submitted on 11/6/2024)  If you checked off any problems, how difficult have these problems made it for you to do your work, take care of things at home, or get along with other people?: Not difficult at all  PHQ9 TOTAL SCORE: 2    "

## 2024-11-10 PROBLEM — F64.0 GENDER DYSPHORIA IN ADULT: Status: ACTIVE | Noted: 2024-11-10

## 2024-11-13 ENCOUNTER — OFFICE VISIT (OUTPATIENT)
Dept: FAMILY MEDICINE | Facility: CLINIC | Age: 24
End: 2024-11-13
Payer: COMMERCIAL

## 2024-11-13 VITALS
TEMPERATURE: 98.2 F | WEIGHT: 157.7 LBS | HEART RATE: 88 BPM | DIASTOLIC BLOOD PRESSURE: 70 MMHG | RESPIRATION RATE: 10 BRPM | OXYGEN SATURATION: 100 % | HEIGHT: 64 IN | BODY MASS INDEX: 26.92 KG/M2 | SYSTOLIC BLOOD PRESSURE: 122 MMHG

## 2024-11-13 DIAGNOSIS — Z13.0 SCREENING FOR ENDOCRINE, NUTRITIONAL, METABOLIC AND IMMUNITY DISORDER: ICD-10-CM

## 2024-11-13 DIAGNOSIS — F64.0 GENDER DYSPHORIA IN ADULT: Primary | ICD-10-CM

## 2024-11-13 DIAGNOSIS — Z11.59 NEED FOR HEPATITIS C SCREENING TEST: ICD-10-CM

## 2024-11-13 DIAGNOSIS — Z13.21 SCREENING FOR ENDOCRINE, NUTRITIONAL, METABOLIC AND IMMUNITY DISORDER: ICD-10-CM

## 2024-11-13 DIAGNOSIS — Z11.4 SCREENING FOR HIV (HUMAN IMMUNODEFICIENCY VIRUS): ICD-10-CM

## 2024-11-13 DIAGNOSIS — Z13.228 SCREENING FOR ENDOCRINE, NUTRITIONAL, METABOLIC AND IMMUNITY DISORDER: ICD-10-CM

## 2024-11-13 DIAGNOSIS — Z13.29 SCREENING FOR ENDOCRINE, NUTRITIONAL, METABOLIC AND IMMUNITY DISORDER: ICD-10-CM

## 2024-11-13 PROCEDURE — 99214 OFFICE O/P EST MOD 30 MIN: CPT | Performed by: FAMILY MEDICINE

## 2024-11-13 PROCEDURE — G2211 COMPLEX E/M VISIT ADD ON: HCPCS | Performed by: FAMILY MEDICINE

## 2024-11-13 RX ORDER — ESTRADIOL 0.1 MG/D
1 FILM, EXTENDED RELEASE TRANSDERMAL
Qty: 25 PATCH | Refills: 1 | Status: SHIPPED | OUTPATIENT
Start: 2024-11-14

## 2024-11-13 NOTE — PROGRESS NOTES
"  Assessment & Plan   The longitudinal plan of care for the diagnosis(es)/condition(s) as documented were addressed during this visit. Due to the added complexity in care, I will continue to support Sherice in the subsequent management and with ongoing continuity of care.  Gender dysphoria in adult  Reviewed expectations and follow-up.  She has chosen patch form of estradiol. We discussed its use. She can switch forms if needed in the future.  - estradiol (VIVELLE-DOT) 0.1 MG/24HR bi-weekly patch  Dispense: 25 patch; Refill: 1  - Estradiol  - Testosterone total    Screening for HIV (human immunodeficiency virus)  - HIV Antigen Antibody Combo    Need for hepatitis C screening test  - Hepatitis C Screen Reflex to HCV RNA Quant and Genotype    Screening for endocrine, nutritional, metabolic and immunity disorder  - Comprehensive metabolic panel (BMP + Alb, Alk Phos, ALT, AST, Total. Bili, TP)  - CBC with platelets and differential  - TSH with free T4 reflex  - Lipid panel reflex to direct LDL Fasting    BMI  Estimated body mass index is 27.07 kg/m  as calculated from the following:    Height as of this encounter: 1.626 m (5' 4\").    Weight as of this encounter: 71.5 kg (157 lb 11.2 oz).     Subjective   Sherice is a 24 year old, presenting for the following health issues:  Follow Up (Discuss HRT)      11/13/2024     2:03 PM   Additional Questions   Roomed by LIZET Corey     Has chosen the patch. Feels excited to start.    History of Present Illness       Reason for visit:  Transgender care   She is taking medications regularly.       Objective    /70 (BP Location: Left arm, Patient Position: Sitting, Cuff Size: Adult Regular)   Pulse 88   Temp 98.2  F (36.8  C) (Tympanic)   Resp 10   Ht 1.626 m (5' 4\")   Wt 71.5 kg (157 lb 11.2 oz)   SpO2 100%   BMI 27.07 kg/m    Body mass index is 27.07 kg/m .  Physical Exam   GENERAL: healthy, alert and no distress  EYES: grossly normal to inspection, and conjunctivae and " sclerae normal  RESP: breathing unlabored, no cough or throat clearing.  MS: no gross musculoskeletal defects noted.  SKIN: no suspicious lesions or rashes visible  NEURO: Normal strength and tone, mentation intact and speech normal  PSYCH: mentation appears normal, affect normal/bright   Signed Electronically by: CALLY HENAO MD

## 2024-12-19 NOTE — PLAN OF CARE
"Problem: Adult Behavioral Health Plan of Care  Goal: Patient-Specific Goal (Individualization)  Description: You can add care plan individualizations to a care plan. Examples of Individualization might be:  \"Parent requests to be called daily at 9am for status\", \"I have a hard time hearing out of my right ear\", or \"Do not touch me to wake me up as it startles  me\".  Outcome: Progressing     Problem: Suicide Risk  Goal: Absence of Self-Harm  Description: Pt will remain safe on unit every shift as evidenced by identify and utilize 3 coping skills and adhere to medication therapy plan established; pt will approach staff if need assist and or difficulty managing disturbing thoughts or emotions.  Intervention: Establish Safety Plan and Continuity of Care  Recent Flowsheet Documentation  Taken 8/14/2024 1600 by Pablo Jeffrey RN  Safe Transition Promotion: protective factors promoted   Goal Outcome Evaluation:    Pt was active and social with selected peers and staff in the milieu.  Pt was alert and oriented x 4, pleasant and cooperative with staff.   Pt's vitals (BP ) was initially elevated at the start of the shift. Pt was advised to drink fluids since pt did not drank any fluids since lunch. Pt vitals were re-checked - with in normal limits.     Affect is flat.   Pt reports okay appetite, ate about 100 % of dinner.    Pt checked in as doing okay, rated anxiety at 5/10 and depression at 5/10 with 10 being worst. Pt rated overall mood at bored.  Pt denies SI/SIB/HI. Denies visual and auditory hallucinations. Pt set no goal for today.    PRN hydroxyzine hcl 50 mg tablet for anxiety was equested and received during this shift. Pt was medication compliant, denied pain, medication side effects and medical concerns.    " TSH with reflex

## 2025-02-13 ENCOUNTER — LAB (OUTPATIENT)
Dept: LAB | Facility: CLINIC | Age: 25
End: 2025-02-13
Payer: COMMERCIAL

## 2025-02-13 DIAGNOSIS — Z11.4 SCREENING FOR HIV (HUMAN IMMUNODEFICIENCY VIRUS): ICD-10-CM

## 2025-02-13 DIAGNOSIS — Z13.0 SCREENING FOR ENDOCRINE, NUTRITIONAL, METABOLIC AND IMMUNITY DISORDER: ICD-10-CM

## 2025-02-13 DIAGNOSIS — Z13.21 SCREENING FOR ENDOCRINE, NUTRITIONAL, METABOLIC AND IMMUNITY DISORDER: ICD-10-CM

## 2025-02-13 DIAGNOSIS — F64.0 GENDER DYSPHORIA IN ADULT: ICD-10-CM

## 2025-02-13 DIAGNOSIS — Z11.59 NEED FOR HEPATITIS C SCREENING TEST: ICD-10-CM

## 2025-02-13 DIAGNOSIS — Z13.228 SCREENING FOR ENDOCRINE, NUTRITIONAL, METABOLIC AND IMMUNITY DISORDER: ICD-10-CM

## 2025-02-13 DIAGNOSIS — Z13.29 SCREENING FOR ENDOCRINE, NUTRITIONAL, METABOLIC AND IMMUNITY DISORDER: ICD-10-CM

## 2025-02-13 LAB
BASOPHILS # BLD AUTO: 0 10E3/UL (ref 0–0.2)
BASOPHILS NFR BLD AUTO: 1 %
EOSINOPHIL # BLD AUTO: 0.5 10E3/UL (ref 0–0.7)
EOSINOPHIL NFR BLD AUTO: 8 %
ERYTHROCYTE [DISTWIDTH] IN BLOOD BY AUTOMATED COUNT: 13 % (ref 10–15)
HCT VFR BLD AUTO: 40.5 % (ref 35–53)
HGB BLD-MCNC: 14.8 G/DL (ref 11.7–17.7)
IMM GRANULOCYTES # BLD: 0 10E3/UL
IMM GRANULOCYTES NFR BLD: 0 %
LYMPHOCYTES # BLD AUTO: 1.3 10E3/UL (ref 0.8–5.3)
LYMPHOCYTES NFR BLD AUTO: 19 %
MCH RBC QN AUTO: 29.7 PG (ref 26.5–33)
MCHC RBC AUTO-ENTMCNC: 36.5 G/DL (ref 31.5–36.5)
MCV RBC AUTO: 81 FL (ref 78–100)
MONOCYTES # BLD AUTO: 0.6 10E3/UL (ref 0–1.3)
MONOCYTES NFR BLD AUTO: 9 %
NEUTROPHILS # BLD AUTO: 4.2 10E3/UL (ref 1.6–8.3)
NEUTROPHILS NFR BLD AUTO: 63 %
PLATELET # BLD AUTO: 250 10E3/UL (ref 150–450)
RBC # BLD AUTO: 4.99 10E6/UL (ref 3.8–5.9)
WBC # BLD AUTO: 6.6 10E3/UL (ref 4–11)

## 2025-02-15 LAB — TESTOST SERPL-MCNC: 477 NG/DL (ref 8–950)

## 2025-02-19 ENCOUNTER — OFFICE VISIT (OUTPATIENT)
Dept: FAMILY MEDICINE | Facility: CLINIC | Age: 25
End: 2025-02-19
Payer: COMMERCIAL

## 2025-02-19 VITALS
OXYGEN SATURATION: 98 % | RESPIRATION RATE: 16 BRPM | SYSTOLIC BLOOD PRESSURE: 116 MMHG | BODY MASS INDEX: 26.46 KG/M2 | DIASTOLIC BLOOD PRESSURE: 75 MMHG | HEIGHT: 64 IN | HEART RATE: 95 BPM | TEMPERATURE: 97.6 F | WEIGHT: 155 LBS

## 2025-02-19 DIAGNOSIS — Z13.0 SCREENING FOR ENDOCRINE, NUTRITIONAL, METABOLIC AND IMMUNITY DISORDER: ICD-10-CM

## 2025-02-19 DIAGNOSIS — Z13.29 SCREENING FOR ENDOCRINE, NUTRITIONAL, METABOLIC AND IMMUNITY DISORDER: ICD-10-CM

## 2025-02-19 DIAGNOSIS — F64.0 GENDER DYSPHORIA IN ADULT: ICD-10-CM

## 2025-02-19 DIAGNOSIS — Z11.3 SCREENING EXAMINATION FOR STI: ICD-10-CM

## 2025-02-19 DIAGNOSIS — Z13.21 SCREENING FOR ENDOCRINE, NUTRITIONAL, METABOLIC AND IMMUNITY DISORDER: ICD-10-CM

## 2025-02-19 DIAGNOSIS — Z00.00 ROUTINE GENERAL MEDICAL EXAMINATION AT A HEALTH CARE FACILITY: Primary | ICD-10-CM

## 2025-02-19 DIAGNOSIS — Z13.228 SCREENING FOR ENDOCRINE, NUTRITIONAL, METABOLIC AND IMMUNITY DISORDER: ICD-10-CM

## 2025-02-19 LAB — T PALLIDUM AB SER QL: NONREACTIVE

## 2025-02-19 PROCEDURE — 86780 TREPONEMA PALLIDUM: CPT | Performed by: FAMILY MEDICINE

## 2025-02-19 PROCEDURE — 36415 COLL VENOUS BLD VENIPUNCTURE: CPT | Performed by: FAMILY MEDICINE

## 2025-02-19 PROCEDURE — 87591 N.GONORRHOEAE DNA AMP PROB: CPT | Performed by: FAMILY MEDICINE

## 2025-02-19 PROCEDURE — 99395 PREV VISIT EST AGE 18-39: CPT | Performed by: FAMILY MEDICINE

## 2025-02-19 PROCEDURE — 87491 CHLMYD TRACH DNA AMP PROBE: CPT | Performed by: FAMILY MEDICINE

## 2025-02-19 RX ORDER — SPIRONOLACTONE 100 MG/1
100 TABLET, FILM COATED ORAL DAILY
Qty: 180 TABLET | Refills: 0 | Status: SHIPPED | OUTPATIENT
Start: 2025-02-19

## 2025-02-19 RX ORDER — HYDROXYZINE PAMOATE 50 MG/1
50 CAPSULE ORAL 4 TIMES DAILY PRN
COMMUNITY
Start: 2024-12-18

## 2025-02-19 RX ORDER — TRAZODONE HYDROCHLORIDE 50 MG/1
50-100 TABLET ORAL
COMMUNITY
Start: 2024-12-11

## 2025-02-19 SDOH — HEALTH STABILITY: PHYSICAL HEALTH: ON AVERAGE, HOW MANY DAYS PER WEEK DO YOU ENGAGE IN MODERATE TO STRENUOUS EXERCISE (LIKE A BRISK WALK)?: 1 DAY

## 2025-02-19 ASSESSMENT — PAIN SCALES - GENERAL: PAINLEVEL_OUTOF10: NO PAIN (0)

## 2025-02-19 ASSESSMENT — SOCIAL DETERMINANTS OF HEALTH (SDOH): HOW OFTEN DO YOU GET TOGETHER WITH FRIENDS OR RELATIVES?: ONCE A WEEK
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
## 2025-02-20 LAB
C TRACH DNA SPEC QL PROBE+SIG AMP: NEGATIVE
C TRACH DNA SPEC QL PROBE+SIG AMP: NEGATIVE
N GONORRHOEA DNA SPEC QL NAA+PROBE: NEGATIVE
N GONORRHOEA DNA SPEC QL NAA+PROBE: NEGATIVE
SPECIMEN TYPE: NORMAL
SPECIMEN TYPE: NORMAL

## 2025-04-28 DIAGNOSIS — F64.0 GENDER DYSPHORIA IN ADULT: ICD-10-CM

## 2025-04-28 RX ORDER — ESTRADIOL 0.1 MG/D
FILM, EXTENDED RELEASE TRANSDERMAL
Qty: 24 PATCH | Refills: 0 | Status: SHIPPED | OUTPATIENT
Start: 2025-04-28

## 2025-05-01 ENCOUNTER — MYC REFILL (OUTPATIENT)
Dept: FAMILY MEDICINE | Facility: CLINIC | Age: 25
End: 2025-05-01
Payer: COMMERCIAL

## 2025-05-01 DIAGNOSIS — F64.0 GENDER DYSPHORIA IN ADULT: ICD-10-CM

## 2025-05-01 RX ORDER — ESTRADIOL 0.1 MG/D
FILM, EXTENDED RELEASE TRANSDERMAL
Qty: 24 PATCH | Refills: 0 | OUTPATIENT
Start: 2025-05-01

## 2025-07-26 DIAGNOSIS — F64.0 GENDER DYSPHORIA IN ADULT: ICD-10-CM

## 2025-07-28 RX ORDER — ESTRADIOL 0.1 MG/D
FILM, EXTENDED RELEASE TRANSDERMAL
Qty: 25 PATCH | Refills: 1 | Status: SHIPPED | OUTPATIENT
Start: 2025-07-28

## 2025-09-02 ENCOUNTER — OFFICE VISIT (OUTPATIENT)
Dept: URGENT CARE | Facility: URGENT CARE | Age: 25
End: 2025-09-02

## 2025-09-02 VITALS
WEIGHT: 167 LBS | HEIGHT: 64 IN | HEART RATE: 79 BPM | RESPIRATION RATE: 16 BRPM | DIASTOLIC BLOOD PRESSURE: 84 MMHG | SYSTOLIC BLOOD PRESSURE: 134 MMHG | BODY MASS INDEX: 28.51 KG/M2 | OXYGEN SATURATION: 97 %

## 2025-09-02 DIAGNOSIS — R07.0 THROAT PAIN: ICD-10-CM

## 2025-09-02 DIAGNOSIS — H65.92 OME (OTITIS MEDIA WITH EFFUSION), LEFT: Primary | ICD-10-CM

## 2025-09-02 LAB
DEPRECATED S PYO AG THROAT QL EIA: NEGATIVE
S PYO DNA THROAT QL NAA+PROBE: NOT DETECTED

## 2025-09-02 PROCEDURE — 87651 STREP A DNA AMP PROBE: CPT | Performed by: NURSE PRACTITIONER

## 2025-09-02 PROCEDURE — 99213 OFFICE O/P EST LOW 20 MIN: CPT | Performed by: NURSE PRACTITIONER
